# Patient Record
Sex: FEMALE | Race: WHITE | NOT HISPANIC OR LATINO
[De-identification: names, ages, dates, MRNs, and addresses within clinical notes are randomized per-mention and may not be internally consistent; named-entity substitution may affect disease eponyms.]

---

## 2017-02-23 ENCOUNTER — APPOINTMENT (OUTPATIENT)
Dept: ENDOCRINOLOGY | Facility: CLINIC | Age: 82
End: 2017-02-23

## 2017-02-23 VITALS
SYSTOLIC BLOOD PRESSURE: 150 MMHG | WEIGHT: 150 LBS | OXYGEN SATURATION: 98 % | HEIGHT: 63 IN | BODY MASS INDEX: 26.58 KG/M2 | HEART RATE: 69 BPM | DIASTOLIC BLOOD PRESSURE: 62 MMHG

## 2017-02-23 RX ORDER — OLMESARTAN MEDOXOMIL 40 MG/1
40 TABLET, FILM COATED ORAL
Qty: 90 | Refills: 0 | Status: ACTIVE | COMMUNITY
Start: 2017-02-13

## 2017-02-23 RX ORDER — DENOSUMAB 60 MG/ML
60 INJECTION SUBCUTANEOUS
Qty: 1 | Refills: 0 | Status: COMPLETED | OUTPATIENT
Start: 2017-02-23

## 2017-02-23 RX ORDER — AMLODIPINE BESYLATE 5 MG/1
5 TABLET ORAL
Qty: 30 | Refills: 0 | Status: COMPLETED | COMMUNITY
Start: 2017-01-16

## 2017-02-23 RX ORDER — OLMESARTAN MEDOXOMIL AND HYDROCHLOROTHIAZIDE 20; 12.5 MG/1; MG/1
20-12.5 TABLET ORAL
Qty: 30 | Refills: 0 | Status: COMPLETED | COMMUNITY
Start: 2017-01-16

## 2017-02-23 RX ADMIN — DENOSUMAB 0 MG/ML: 60 INJECTION SUBCUTANEOUS at 00:00

## 2017-08-30 ENCOUNTER — APPOINTMENT (OUTPATIENT)
Dept: ENDOCRINOLOGY | Facility: CLINIC | Age: 82
End: 2017-08-30
Payer: MEDICARE

## 2017-08-30 VITALS
OXYGEN SATURATION: 98 % | SYSTOLIC BLOOD PRESSURE: 110 MMHG | DIASTOLIC BLOOD PRESSURE: 50 MMHG | BODY MASS INDEX: 26.22 KG/M2 | HEIGHT: 63 IN | HEART RATE: 75 BPM | WEIGHT: 148 LBS

## 2017-08-30 PROCEDURE — 99214 OFFICE O/P EST MOD 30 MIN: CPT | Mod: 25

## 2017-08-30 PROCEDURE — 96372 THER/PROPH/DIAG INJ SC/IM: CPT

## 2017-08-30 RX ORDER — DENOSUMAB 60 MG/ML
60 INJECTION SUBCUTANEOUS
Qty: 1 | Refills: 0 | Status: COMPLETED | OUTPATIENT
Start: 2017-08-30

## 2017-08-30 RX ADMIN — DENOSUMAB 0 MG/ML: 60 INJECTION SUBCUTANEOUS at 00:00

## 2017-09-07 ENCOUNTER — APPOINTMENT (OUTPATIENT)
Dept: ENDOCRINOLOGY | Facility: CLINIC | Age: 82
End: 2017-09-07
Payer: MEDICARE

## 2017-09-07 VITALS — WEIGHT: 144 LBS | BODY MASS INDEX: 25.2 KG/M2 | HEIGHT: 63.5 IN

## 2017-09-07 PROCEDURE — 77080 DXA BONE DENSITY AXIAL: CPT

## 2017-11-10 ENCOUNTER — APPOINTMENT (OUTPATIENT)
Dept: MAMMOGRAPHY | Facility: IMAGING CENTER | Age: 82
End: 2017-11-10

## 2017-12-05 ENCOUNTER — APPOINTMENT (OUTPATIENT)
Dept: MAMMOGRAPHY | Facility: IMAGING CENTER | Age: 82
End: 2017-12-05
Payer: MEDICARE

## 2017-12-05 ENCOUNTER — OUTPATIENT (OUTPATIENT)
Dept: OUTPATIENT SERVICES | Facility: HOSPITAL | Age: 82
LOS: 1 days | End: 2017-12-05
Payer: MEDICARE

## 2017-12-05 DIAGNOSIS — Z12.31 ENCOUNTER FOR SCREENING MAMMOGRAM FOR MALIGNANT NEOPLASM OF BREAST: ICD-10-CM

## 2017-12-05 PROCEDURE — 77067 SCR MAMMO BI INCL CAD: CPT

## 2017-12-05 PROCEDURE — 77063 BREAST TOMOSYNTHESIS BI: CPT | Mod: 26

## 2017-12-05 PROCEDURE — 77063 BREAST TOMOSYNTHESIS BI: CPT

## 2017-12-05 PROCEDURE — G0202: CPT | Mod: 26

## 2018-03-08 ENCOUNTER — APPOINTMENT (OUTPATIENT)
Dept: ENDOCRINOLOGY | Facility: CLINIC | Age: 83
End: 2018-03-08
Payer: MEDICARE

## 2018-03-08 VITALS
RESPIRATION RATE: 16 BRPM | HEART RATE: 68 BPM | OXYGEN SATURATION: 98 % | HEIGHT: 63.5 IN | BODY MASS INDEX: 25.72 KG/M2 | WEIGHT: 147 LBS | DIASTOLIC BLOOD PRESSURE: 58 MMHG | SYSTOLIC BLOOD PRESSURE: 130 MMHG

## 2018-03-08 PROCEDURE — 99214 OFFICE O/P EST MOD 30 MIN: CPT | Mod: 25

## 2018-03-08 PROCEDURE — 96372 THER/PROPH/DIAG INJ SC/IM: CPT

## 2018-03-08 RX ORDER — DENOSUMAB 60 MG/ML
60 INJECTION SUBCUTANEOUS
Qty: 0 | Refills: 0 | Status: COMPLETED | OUTPATIENT
Start: 2018-03-08

## 2018-03-08 RX ORDER — ROSUVASTATIN CALCIUM 5 MG/1
5 TABLET, FILM COATED ORAL
Refills: 0 | Status: DISCONTINUED | COMMUNITY
End: 2018-03-08

## 2018-03-08 RX ADMIN — DENOSUMAB 0 MG/ML: 60 INJECTION SUBCUTANEOUS at 00:00

## 2018-09-12 ENCOUNTER — APPOINTMENT (OUTPATIENT)
Dept: ENDOCRINOLOGY | Facility: CLINIC | Age: 83
End: 2018-09-12
Payer: MEDICARE

## 2018-09-12 VITALS
OXYGEN SATURATION: 98 % | HEIGHT: 63.5 IN | WEIGHT: 149 LBS | HEART RATE: 70 BPM | SYSTOLIC BLOOD PRESSURE: 140 MMHG | DIASTOLIC BLOOD PRESSURE: 52 MMHG | BODY MASS INDEX: 26.08 KG/M2

## 2018-09-12 PROCEDURE — 99214 OFFICE O/P EST MOD 30 MIN: CPT | Mod: 25

## 2018-09-12 PROCEDURE — 77080 DXA BONE DENSITY AXIAL: CPT | Mod: GA

## 2018-09-12 PROCEDURE — 96372 THER/PROPH/DIAG INJ SC/IM: CPT

## 2018-09-12 RX ORDER — DENOSUMAB 60 MG/ML
60 INJECTION SUBCUTANEOUS
Qty: 0 | Refills: 0 | Status: COMPLETED | OUTPATIENT
Start: 2018-09-12

## 2018-09-12 RX ADMIN — DENOSUMAB 0 MG/ML: 60 INJECTION SUBCUTANEOUS at 00:00

## 2019-04-10 ENCOUNTER — APPOINTMENT (OUTPATIENT)
Dept: ENDOCRINOLOGY | Facility: CLINIC | Age: 84
End: 2019-04-10
Payer: MEDICARE

## 2019-04-10 VITALS
HEART RATE: 64 BPM | WEIGHT: 150 LBS | HEIGHT: 63.5 IN | DIASTOLIC BLOOD PRESSURE: 58 MMHG | BODY MASS INDEX: 26.25 KG/M2 | RESPIRATION RATE: 98 BRPM | SYSTOLIC BLOOD PRESSURE: 130 MMHG

## 2019-04-10 PROCEDURE — 99214 OFFICE O/P EST MOD 30 MIN: CPT | Mod: 25

## 2019-04-10 PROCEDURE — 96372 THER/PROPH/DIAG INJ SC/IM: CPT

## 2019-04-10 RX ORDER — DENOSUMAB 60 MG/ML
60 INJECTION SUBCUTANEOUS
Refills: 0 | Status: COMPLETED | OUTPATIENT
Start: 2019-04-10

## 2019-04-10 RX ORDER — EVOLOCUMAB 140 MG/ML
INJECTION, SOLUTION SUBCUTANEOUS
Refills: 0 | Status: ACTIVE | COMMUNITY

## 2019-04-10 RX ORDER — OMEGA-3S/DHA/EPA/FISH OIL 300-1000MG
CAPSULE ORAL
Refills: 0 | Status: DISCONTINUED | COMMUNITY
End: 2019-04-10

## 2019-04-10 RX ORDER — AMLODIPINE BESYLATE 10 MG/1
10 TABLET ORAL
Qty: 90 | Refills: 0 | Status: DISCONTINUED | COMMUNITY
Start: 2017-02-13 | End: 2019-04-10

## 2019-04-10 RX ADMIN — DENOSUMAB 60 MG/ML: 60 INJECTION SUBCUTANEOUS at 00:00

## 2019-04-11 NOTE — END OF VISIT
[FreeTextEntry3] : I, Alina Ledesma, authored this note working as a medical scribe for Dr. Foster.  04/10/2019.  2:15PM. \par This note was authored by Alina Ledesma working as medical scribe for me. I have reviewed, edited, and revised the note as needed. I am in agreement with the exam findings, imaging findings, and treatment plan.  Nilson Foster MD

## 2019-04-11 NOTE — PROCEDURE
[FreeTextEntry1] : Bone mineral density: 09/12/2018 \par Indication: vs.  2017 prior test showed bone loss\par Spine: not done\par Total hip: -2.3 osteopenia, no significant change \par Femoral neck: -2.5 osteoporosis, no significant change \par Proximal radius: -2.7 osteoporosis (+4.6%)\par \par bone mineral density September 2017\par Lumbar spine 1-4 Not performed\par Total hip: -2.3 osteopenia, no significant change \par Femoral neck: -2.7  osteoporosis, -4.1% not significant   \par Proximal wrist: -3.1 osteoporosis, no significant change  \par \par bone mineral density 7/1/15\par spine not performed to scoliosis\par Total hip -2.2, osteopenia, no significant change versus 2013\par Femoral neck -2.5, osteoporosis, no significant change versus 2013\par Proximal radius -3.1, osteoporosis, no significant change

## 2019-04-11 NOTE — ASSESSMENT
[FreeTextEntry1] : 84 y/o female with osteoporosis and hypothyroidism. \par \par 1. Osteoporosis: Previously treated with Fosamax, HRT, and Forteo (2 years). On Prolia since 2011 tolerating well. No thigh pain. No ONJ. No interval fx. BMD 2013 stable. BMD 2017 stable although low with sl decrease in fem neck. BMD 9/2018 indicates stable hip density and improvement in the proximal radius. c/o back pain. Continue Prolia buy and bill. \par \par 2. Hypothyroidism: clinically and chemically euthyroid on LT4 100 mcg daily, Dr. Hernández inc dose to levothyroxine to 100. \par \par Labs from Dr. Dionicio Hernández. \par \par f/u in 6 mons.  [Denosumab Therapy] : Risks  and benefits of denosumab therapy were discussed with the patient including eczema, cellulitis, osteonecrosis of the jaw and atypical femur fractures

## 2019-04-11 NOTE — PHYSICAL EXAM
[Alert] : alert [No Acute Distress] : no acute distress [Well Nourished] : well nourished [Well Developed] : well developed [Normal Sclera/Conjunctiva] : normal sclera/conjunctiva [Normal Oropharynx] : the oropharynx was normal [No Proptosis] : no proptosis [Thyroid Not Enlarged] : the thyroid was not enlarged [No Thyroid Nodules] : there were no palpable thyroid nodules [No Respiratory Distress] : no respiratory distress [No Accessory Muscle Use] : no accessory muscle use [Clear to Auscultation] : lungs were clear to auscultation bilaterally [Normal Rate] : heart rate was normal  [Normal S1, S2] : normal S1 and S2 [Regular Rhythm] : with a regular rhythm [Normal Bowel Sounds] : normal bowel sounds [Not Tender] : non-tender [Not Distended] : not distended [Soft] : abdomen soft [No Spinal Tenderness] : no spinal tenderness [Kyphosis] : kyphosis present [No Stigmata of Cushings Syndrome] : no stigmata of cushings syndrome [Normal Strength/Tone] : muscle strength and tone were normal [Normal Gait] : normal gait [Normal Reflexes] : deep tendon reflexes were 2+ and symmetric [Oriented x3] : oriented to person, place, and time [No Tremors] : no tremors [de-identified] : erythematous macular rash mostly back

## 2019-04-11 NOTE — REVIEW OF SYSTEMS
[Negative] : Heme/Lymph [All other systems negative] : All other systems negative [Back Pain] : back pain [Muscle Cramps] : muscle cramps [Fatigue] : no fatigue [Chest Pain] : no chest pain [Shortness Of Breath] : no shortness of breath [Nausea] : no nausea [Vomiting] : no vomiting was observed [Constipation] : no constipation [Diarrhea] : no diarrhea [Cold Intolerance] : cold tolerant [Heat Intolerance] : heat tolerant [FreeTextEntry5] : PETR

## 2019-04-11 NOTE — HISTORY OF PRESENT ILLNESS
[Alendronate (Fosomax)] : Alendronate [Estrogens (HRT)] : HRT [Patient taking Meds Correctly] : Patient is taking meds correctly [Teriparatide (Forteo)] : Teriparatide [Prolia (Denosumab)] : Prolia (Denosumab) [Regular Dental Follow-Up] : regular dental follow-up [Previous Fragility Fracture] : previous fragility fracture(s) [FreeTextEntry1] : f/u 84 y/o female with osteoporosis and hypothyroidism. \par \par Prev on HRT then Fosamax then Forteo for 2 years.On Prolia since Nov 2011. BMD 2015 stable. BMD 9/2017 stable, sl decrease fem neck. BMD 2018 improved in proximal radius. Last DDS within 3 mons. No ONJ. No thigh pain. No interval fx. \par \par Previously taking Levothyroxine 100 mcg daily. No fatigue, palpitations, tremors, heat or cold intolerance, constipation or diarrhea.\par \par Pt had an episode of Vertigo for 3 mons thought to be from Cipro (for back pain). \par  [Disordered Eating] : no past or present history of disordered eating [Taking Steroids] : no past or present history of taking steroids [Kidney Stones] : no history of kidney stones [Hyperparathyroidism] : no hyperparathyroidism [de-identified] : prior comp fx

## 2019-10-16 ENCOUNTER — APPOINTMENT (OUTPATIENT)
Dept: ENDOCRINOLOGY | Facility: CLINIC | Age: 84
End: 2019-10-16
Payer: MEDICARE

## 2019-10-16 VITALS
HEART RATE: 68 BPM | DIASTOLIC BLOOD PRESSURE: 62 MMHG | SYSTOLIC BLOOD PRESSURE: 124 MMHG | OXYGEN SATURATION: 98 % | RESPIRATION RATE: 18 BRPM

## 2019-10-16 PROCEDURE — 99212 OFFICE O/P EST SF 10 MIN: CPT | Mod: 25

## 2019-10-16 PROCEDURE — 96372 THER/PROPH/DIAG INJ SC/IM: CPT

## 2019-10-16 RX ORDER — DENOSUMAB 60 MG/ML
60 INJECTION SUBCUTANEOUS
Qty: 1 | Refills: 0 | Status: COMPLETED | OUTPATIENT
Start: 2019-10-16

## 2019-10-16 RX ADMIN — DENOSUMAB 0 MG/ML: 60 INJECTION SUBCUTANEOUS at 00:00

## 2020-05-15 ENCOUNTER — APPOINTMENT (OUTPATIENT)
Dept: ENDOCRINOLOGY | Facility: CLINIC | Age: 85
End: 2020-05-15
Payer: MEDICARE

## 2020-05-15 VITALS — DIASTOLIC BLOOD PRESSURE: 60 MMHG | SYSTOLIC BLOOD PRESSURE: 132 MMHG

## 2020-05-15 VITALS
WEIGHT: 134 LBS | DIASTOLIC BLOOD PRESSURE: 62 MMHG | SYSTOLIC BLOOD PRESSURE: 144 MMHG | TEMPERATURE: 98 F | HEIGHT: 63.5 IN | BODY MASS INDEX: 23.45 KG/M2 | HEART RATE: 77 BPM | OXYGEN SATURATION: 99 %

## 2020-05-15 DIAGNOSIS — I10 ESSENTIAL (PRIMARY) HYPERTENSION: ICD-10-CM

## 2020-05-15 PROCEDURE — 99214 OFFICE O/P EST MOD 30 MIN: CPT | Mod: 25

## 2020-05-15 PROCEDURE — 96372 THER/PROPH/DIAG INJ SC/IM: CPT

## 2020-05-15 RX ORDER — DENOSUMAB 60 MG/ML
60 INJECTION SUBCUTANEOUS
Qty: 1 | Refills: 0 | Status: COMPLETED | OUTPATIENT
Start: 2020-05-15

## 2020-05-15 RX ORDER — AMLODIPINE BESYLATE 10 MG/1
10 TABLET ORAL
Refills: 0 | Status: ACTIVE | COMMUNITY

## 2020-05-15 RX ADMIN — DENOSUMAB 60 MG/ML: 60 INJECTION SUBCUTANEOUS at 00:00

## 2020-05-15 NOTE — REASON FOR VISIT
[Follow - Up] : a follow-up visit [Osteoporosis] : osteoporosis [Follow-Up: _____] : a [unfilled] follow-up visit [FreeTextEntry1] : osteoporosis

## 2020-06-29 ENCOUNTER — APPOINTMENT (OUTPATIENT)
Dept: RADIOLOGY | Facility: IMAGING CENTER | Age: 85
End: 2020-06-29
Payer: MEDICARE

## 2020-06-29 ENCOUNTER — OUTPATIENT (OUTPATIENT)
Dept: OUTPATIENT SERVICES | Facility: HOSPITAL | Age: 85
LOS: 1 days | End: 2020-06-29
Payer: MEDICARE

## 2020-06-29 ENCOUNTER — APPOINTMENT (OUTPATIENT)
Dept: ULTRASOUND IMAGING | Facility: IMAGING CENTER | Age: 85
End: 2020-06-29
Payer: MEDICARE

## 2020-06-29 ENCOUNTER — APPOINTMENT (OUTPATIENT)
Dept: MAMMOGRAPHY | Facility: IMAGING CENTER | Age: 85
End: 2020-06-29
Payer: MEDICARE

## 2020-06-29 DIAGNOSIS — N63.10 UNSPECIFIED LUMP IN THE RIGHT BREAST, UNSPECIFIED QUADRANT: ICD-10-CM

## 2020-06-29 PROCEDURE — 76642 ULTRASOUND BREAST LIMITED: CPT | Mod: 26,RT

## 2020-06-29 PROCEDURE — 77066 DX MAMMO INCL CAD BI: CPT | Mod: 26

## 2020-06-29 PROCEDURE — G0279: CPT

## 2020-06-29 PROCEDURE — 77066 DX MAMMO INCL CAD BI: CPT

## 2020-06-29 PROCEDURE — G0279: CPT | Mod: 26

## 2020-06-29 PROCEDURE — 76642 ULTRASOUND BREAST LIMITED: CPT

## 2020-06-29 PROCEDURE — 71046 X-RAY EXAM CHEST 2 VIEWS: CPT | Mod: 26

## 2020-06-29 PROCEDURE — 71046 X-RAY EXAM CHEST 2 VIEWS: CPT

## 2020-07-06 NOTE — ADDENDUM
[FreeTextEntry1] : July 2, 2020 telephone\par Patient requests letter of "clearance" prior to dental extraction.  Patient previously advised of the small risk of osteonecrosis of the jaw.  Patient has no specific contraindication to tooth extraction even with small risk of ONJ. Nilson Foster MD

## 2020-07-06 NOTE — HISTORY OF PRESENT ILLNESS
[Alendronate (Fosomax)] : Alendronate [Estrogens (HRT)] : HRT [Teriparatide (Forteo)] : Teriparatide [Patient taking Meds Correctly] : Patient is taking meds correctly [Prolia (Denosumab)] : Prolia (Denosumab) [Regular Dental Follow-Up] : regular dental follow-up [Previous Fragility Fracture] : previous fragility fracture(s) [FreeTextEntry1] :  osteoporosis \par Prev on HRT then Fosamax then Forteo for 2 years.On Prolia since Nov 2011. BMD 2015 stable. BMD 9/2017 stable, sl decrease fem neck. BMD 2018 improved in proximal radius. Last DDS within 6 mons. No ONJ. No thigh pain. No interval fx. \par Thyroid: on Levothyroxine 100 mcg daily. No fatigue, palpitations, tremors, heat or cold intolerance, constipation or diarrhea.\par New HTN added amlodipine\par Sees urology for bladder dysfunction\par  [Disordered Eating] : no past or present history of disordered eating [Kidney Stones] : no history of kidney stones [Taking Steroids] : no past or present history of taking steroids [Hyperparathyroidism] : no hyperparathyroidism [de-identified] : prior comp fx

## 2020-07-06 NOTE — ASSESSMENT
[Denosumab Therapy] : Risks  and benefits of denosumab therapy were discussed with the patient including eczema, cellulitis, osteonecrosis of the jaw and atypical femur fractures [FreeTextEntry1] : 87 y/o female with osteoporosis and hypothyroidism. \par \par 1. Osteoporosis: Previously treated with Fosamax, HRT, and Forteo (2 years). On Prolia since 2011 tolerating well. No thigh pain. No ONJ. No interval fx. BMD 2013 stable. BMD 2017 stable although low with sl decrease in fem neck. BMD 9/2018 indicates stable hip density and improvement in the proximal radius. c/o back pain. Continue Prolia buy and bill. \par \par 2. Hypothyroidism: clinically and chemically euthyroid on LT4 100 mcg daily, Dr. Hernández inc dose to levothyroxine to 100. \par 3.  Hypertension: Blood pressure well controlled on current medication. \par 4. Kyphosis: stable on exam\par Labs from Dr. Dionicio Hernández. \par \par f/u in 6 mons.

## 2020-07-06 NOTE — PHYSICAL EXAM
[Alert] : alert [Well Nourished] : well nourished [No Acute Distress] : no acute distress [Well Developed] : well developed [Normal Sclera/Conjunctiva] : normal sclera/conjunctiva [EOMI] : extra ocular movement intact [No Proptosis] : no proptosis [Normal Oropharynx] : the oropharynx was normal [Thyroid Not Enlarged] : the thyroid was not enlarged [No Thyroid Nodules] : no palpable thyroid nodules [No Respiratory Distress] : no respiratory distress [No Accessory Muscle Use] : no accessory muscle use [Clear to Auscultation] : lungs were clear to auscultation bilaterally [Normal S1, S2] : normal S1 and S2 [Normal Rate] : heart rate was normal [Regular Rhythm] : with a regular rhythm [No Edema] : no peripheral edema [Normal Bowel Sounds] : normal bowel sounds [Not Tender] : non-tender [Not Distended] : not distended [Soft] : abdomen soft [Normal Anterior Cervical Nodes] : no anterior cervical lymphadenopathy [Normal Posterior Cervical Nodes] : no posterior cervical lymphadenopathy [No Spinal Tenderness] : no spinal tenderness [Kyphosis] : kyphosis present [Normal Gait] : normal gait [Normal Strength/Tone] : muscle strength and tone were normal [No Stigmata of Cushings Syndrome] : no stigmata of Cushings Syndrome [No Rash] : no rash [Normal Reflexes] : deep tendon reflexes were 2+ and symmetric [No Tremors] : no tremors [Oriented x3] : oriented to person, place, and time [Acanthosis Nigricans] : no acanthosis nigricans

## 2020-11-20 ENCOUNTER — APPOINTMENT (OUTPATIENT)
Dept: ENDOCRINOLOGY | Facility: CLINIC | Age: 85
End: 2020-11-20

## 2020-11-23 ENCOUNTER — APPOINTMENT (OUTPATIENT)
Dept: ENDOCRINOLOGY | Facility: CLINIC | Age: 85
End: 2020-11-23

## 2020-12-02 ENCOUNTER — APPOINTMENT (OUTPATIENT)
Dept: ENDOCRINOLOGY | Facility: CLINIC | Age: 85
End: 2020-12-02
Payer: MEDICARE

## 2020-12-02 VITALS — WEIGHT: 131 LBS | BODY MASS INDEX: 23.21 KG/M2 | TEMPERATURE: 97.7 F | HEIGHT: 62.9 IN

## 2020-12-02 PROCEDURE — 77080 DXA BONE DENSITY AXIAL: CPT | Mod: GA

## 2020-12-04 ENCOUNTER — APPOINTMENT (OUTPATIENT)
Dept: ENDOCRINOLOGY | Facility: CLINIC | Age: 85
End: 2020-12-04
Payer: MEDICARE

## 2020-12-04 PROCEDURE — 96372 THER/PROPH/DIAG INJ SC/IM: CPT

## 2020-12-04 RX ORDER — DENOSUMAB 60 MG/ML
60 INJECTION SUBCUTANEOUS
Qty: 1 | Refills: 0 | Status: COMPLETED | OUTPATIENT
Start: 2020-12-04

## 2020-12-04 RX ADMIN — DENOSUMAB 0 MG/ML: 60 INJECTION SUBCUTANEOUS at 00:00

## 2021-05-19 ENCOUNTER — APPOINTMENT (OUTPATIENT)
Dept: UROGYNECOLOGY | Facility: CLINIC | Age: 86
End: 2021-05-19
Payer: MEDICARE

## 2021-05-19 VITALS
SYSTOLIC BLOOD PRESSURE: 148 MMHG | TEMPERATURE: 96.9 F | WEIGHT: 130 LBS | BODY MASS INDEX: 22.2 KG/M2 | DIASTOLIC BLOOD PRESSURE: 68 MMHG | HEIGHT: 64 IN

## 2021-05-19 DIAGNOSIS — Z86.79 PERSONAL HISTORY OF OTHER DISEASES OF THE CIRCULATORY SYSTEM: ICD-10-CM

## 2021-05-19 DIAGNOSIS — Z82.3 FAMILY HISTORY OF STROKE: ICD-10-CM

## 2021-05-19 DIAGNOSIS — Z78.9 OTHER SPECIFIED HEALTH STATUS: ICD-10-CM

## 2021-05-19 DIAGNOSIS — Z60.2 PROBLEMS RELATED TO LIVING ALONE: ICD-10-CM

## 2021-05-19 DIAGNOSIS — Z87.39 PERSONAL HISTORY OF OTHER DISEASES OF THE MUSCULOSKELETAL SYSTEM AND CONNECTIVE TISSUE: ICD-10-CM

## 2021-05-19 DIAGNOSIS — Z86.39 PERSONAL HISTORY OF OTHER ENDOCRINE, NUTRITIONAL AND METABOLIC DISEASE: ICD-10-CM

## 2021-05-19 DIAGNOSIS — Z63.4 DISAPPEARANCE AND DEATH OF FAMILY MEMBER: ICD-10-CM

## 2021-05-19 LAB
BILIRUB UR QL STRIP: NORMAL
CLARITY UR: CLEAR
COLLECTION METHOD: NORMAL
GLUCOSE UR-MCNC: NORMAL
HCG UR QL: 0.2 EU/DL
HGB UR QL STRIP.AUTO: NORMAL
KETONES UR-MCNC: NORMAL
LEUKOCYTE ESTERASE UR QL STRIP: NORMAL
NITRITE UR QL STRIP: NORMAL
PH UR STRIP: 5.5
PROT UR STRIP-MCNC: NORMAL
SP GR UR STRIP: 1.01

## 2021-05-19 PROCEDURE — 99204 OFFICE O/P NEW MOD 45 MIN: CPT

## 2021-05-19 PROCEDURE — 81003 URINALYSIS AUTO W/O SCOPE: CPT | Mod: QW

## 2021-05-19 SDOH — SOCIAL STABILITY - SOCIAL INSECURITY: DISSAPEARANCE AND DEATH OF FAMILY MEMBER: Z63.4

## 2021-05-19 SDOH — SOCIAL STABILITY - SOCIAL INSECURITY: PROBLEMS RELATED TO LIVING ALONE: Z60.2

## 2021-05-19 NOTE — DISCUSSION/SUMMARY
[FreeTextEntry1] : We discussed the above noted findings with the patient.  She is going to have a renal ultrasound due to the retention to rule out hydronephrosis.  She will return for urodynamics to better assess cause of incontinence.  We discussed the option of sacral nerve modulation to potentially assist with improved bladder emptying.  The option of intermittent self-catheterization was offered, patient is not interested.  Patient has previously tried BTL emsella and PTNS without improvement. We discussed behavioral modification techniques to help empty the bladder. We will further discuss treatment options after her evaluation is completed. All questions were answered.

## 2021-05-19 NOTE — HISTORY OF PRESENT ILLNESS
[Vaginal Wall Prolapse] : no [Rectal Prolapse] : no [Unable To Restrain Bowel Movement] : no [x2] : nocturia two times a night [Uses ___ pads per day] : uses [unfilled] pad(s) per day [Constipation Obstructed Defecation] : no [Vaginal Pain] : no [] : years ago [FreeTextEntry5] : informed of prolapse many years ago, asymptomatic [de-identified] : constantly [de-identified] : 1 [de-identified] : approx. 50% does not realize leaking [de-identified] : every night [de-identified] : 1+ [de-identified] : 3-4x/day [de-identified] : every night [de-identified] : occasionally [de-identified] : 1 [FreeTextEntry1] : PMH: HLD, arthritis, HTN\par PSH: back surgery (rods)\par \par seen by Dr. Conklin 10+ years ago, took vesicare 10 for many years stopped 1+ year ago\par \par tried BTL emsella, PTNS- no improvement\par pt has seen Dr Olguin in the past.

## 2021-05-19 NOTE — PHYSICAL EXAM
[No Acute Distress] : in no acute distress [Well developed] : well developed [Well Nourished] : ~L well nourished [Oriented x3] : oriented to person, place, and time [Respirations regular] : ~T respiratory rate was regular [No Edema] : ~T edema was not present [Warm and Dry] : was warm and dry to touch [Normal Gait] : gait was normal [Vulvar Atrophy] : vulvar atrophy [Post Void Residual ____ml] : post void residual was [unfilled] ml [Chaperone Present] : A chaperone was present in the examining room during all aspects of the physical examination [Mass (___ Cm)] : no ~M [unfilled] abdominal mass was palpated [Tenderness] : ~T no ~M abdominal tenderness observed [Distended] : not distended [Inguinal LAD] : no adenopathy was noted in the inguinal lymph nodes [Normal Appearance] : general appearance was normal [Atrophy] : atrophy [Normal] : normal

## 2021-05-19 NOTE — REASON FOR VISIT
[Initial Visit ___] : an initial visit for [unfilled] [Urinary Incontinence] : urinary incontinence [Questionnaire Received] : Patient questionnaire received [Intake Form Reviewed] : Patient intake form with past medical history, surgical history, family history and social history reviewed today

## 2021-05-19 NOTE — OB HISTORY
[Total Preg ___] : : [unfilled] [Full Term ___] : [unfilled] (full-term) [Vaginal ___] : [unfilled] vaginal delivery(s) [AB Spont ___] : [unfilled] miscarriage(s) [Definite ___ (Date)] : the last menstrual period was [unfilled]

## 2021-06-01 ENCOUNTER — APPOINTMENT (OUTPATIENT)
Dept: ULTRASOUND IMAGING | Facility: CLINIC | Age: 86
End: 2021-06-01
Payer: MEDICARE

## 2021-06-01 PROCEDURE — 76770 US EXAM ABDO BACK WALL COMP: CPT

## 2021-06-03 ENCOUNTER — TRANSCRIPTION ENCOUNTER (OUTPATIENT)
Age: 86
End: 2021-06-03

## 2021-06-07 ENCOUNTER — RESULT CHARGE (OUTPATIENT)
Age: 86
End: 2021-06-07

## 2021-06-07 ENCOUNTER — APPOINTMENT (OUTPATIENT)
Dept: UROGYNECOLOGY | Facility: CLINIC | Age: 86
End: 2021-06-07
Payer: MEDICARE

## 2021-06-07 ENCOUNTER — APPOINTMENT (OUTPATIENT)
Dept: ENDOCRINOLOGY | Facility: CLINIC | Age: 86
End: 2021-06-07
Payer: MEDICARE

## 2021-06-07 VITALS — HEART RATE: 66 BPM | SYSTOLIC BLOOD PRESSURE: 157 MMHG | TEMPERATURE: 96.8 F | DIASTOLIC BLOOD PRESSURE: 97 MMHG

## 2021-06-07 VITALS
HEIGHT: 64 IN | WEIGHT: 130 LBS | TEMPERATURE: 97.7 F | DIASTOLIC BLOOD PRESSURE: 60 MMHG | BODY MASS INDEX: 22.2 KG/M2 | OXYGEN SATURATION: 98 % | HEART RATE: 63 BPM | SYSTOLIC BLOOD PRESSURE: 130 MMHG

## 2021-06-07 LAB
BILIRUB UR QL STRIP: NEGATIVE
CLARITY UR: NORMAL
COLLECTION METHOD: NORMAL
GLUCOSE UR-MCNC: NEGATIVE
HCG UR QL: 0.2 EU/DL
HGB UR QL STRIP.AUTO: NORMAL
KETONES UR-MCNC: NEGATIVE
LEUKOCYTE ESTERASE UR QL STRIP: NORMAL
NITRITE UR QL STRIP: NEGATIVE
PH UR STRIP: 5.5
PROT UR STRIP-MCNC: NEGATIVE
SP GR UR STRIP: 1.02

## 2021-06-07 PROCEDURE — 96372 THER/PROPH/DIAG INJ SC/IM: CPT

## 2021-06-07 PROCEDURE — 99214 OFFICE O/P EST MOD 30 MIN: CPT

## 2021-06-07 PROCEDURE — 99214 OFFICE O/P EST MOD 30 MIN: CPT | Mod: 25

## 2021-06-07 PROCEDURE — 81003 URINALYSIS AUTO W/O SCOPE: CPT | Mod: QW

## 2021-06-07 RX ORDER — DENOSUMAB 60 MG/ML
60 INJECTION SUBCUTANEOUS
Qty: 1 | Refills: 0 | Status: COMPLETED | OUTPATIENT
Start: 2021-06-07

## 2021-06-07 RX ADMIN — DENOSUMAB 60 MG/ML: 60 INJECTION SUBCUTANEOUS at 00:00

## 2021-06-07 NOTE — PROCEDURE
[Straight Catheterization] : insertion of a straight catheter [Urinary Retention] : urinary retention [Patient] : the patient [Allergies Reviewed] : Allergies reviewed [None] : none [___ Fr Straight Tip] : a [unfilled] in [place] straight tip catheter [Cloudy] : cloudy [Culture] : culture [Urinalysis] : urinalysis [Residual Volume ___ cc] : residual volume [unfilled] cc [No Complications] : no complications [Tolerated Well] : the patient tolerated the procedure well [Post procedure instructions and information given] : Post procedure instructions and information were given and reviewed with patient. [0] : 0

## 2021-06-08 NOTE — DISCUSSION/SUMMARY
[FreeTextEntry1] : Ms. Matson presents today with suspected UTI.  She has incomplete bladder emptying and a new diagnosis of left hydronephrosis.  \par \par #Udip with small leukocytes and trace blood:\par -Rx for Macrobid 100mg PO q 12 hours x 5 days prescribed\par -F/U UA and urine culture\par -UDS rescheduled for 6/21\par \par #Moderate left-sided hydronephrosis\par -Discussed self catheterization versus Flomax. Risks and benefits of both options reviewed\par -The patient is unable to perform self catheterization secondary to previous history of back surgery\par -The patient has decided to try Flomax.  She knows the Flomax may increase urinary leakage and cause hypotension.  She has baseline hypertension and daily home SBP are in the 150s.  Her hypertensive medications are managed by her geriatrician, Dr. Hernández.  The patient agrees to discuss the addition of Flomax to her regimen with her doctor. \par -Rx for Flomax 0.4mg PO daily sent to the pharmacy.\par \par All questions answered to the patient's satisfaction.  She expresses understanding.  She knows to contact the office with any questions or concerns.

## 2021-06-08 NOTE — HISTORY OF PRESENT ILLNESS
[FreeTextEntry1] : This is a 87 year old F with incomplete bladder emptying and urinary incontinence without sensory awareness.  She presented today for UDS.  Urine obtained via sterile straight catheterization for PVR appeared cloudy.  PVR 200cc, Udip showed small leukocytes.  Ms. Matson has no urinary symptoms.  No burning with urination.  No urgency or frequency. \par \par Additionally, renal US on 6/3/2021 showed a large post void urinary bladder residual and moderate left-sided hydronephrosis.

## 2021-06-10 LAB
APPEARANCE: ABNORMAL
BACTERIA: ABNORMAL
BILIRUBIN URINE: NEGATIVE
BLOOD URINE: NEGATIVE
COLOR: YELLOW
GLUCOSE QUALITATIVE U: NEGATIVE
HYALINE CASTS: 0 /LPF
KETONES URINE: NEGATIVE
LEUKOCYTE ESTERASE URINE: ABNORMAL
MICROSCOPIC-UA: NORMAL
NITRITE URINE: NEGATIVE
PH URINE: 6
PROTEIN URINE: NORMAL
RED BLOOD CELLS URINE: 1 /HPF
SPECIFIC GRAVITY URINE: 1.02
SQUAMOUS EPITHELIAL CELLS: 0 /HPF
UROBILINOGEN URINE: NORMAL
WHITE BLOOD CELLS URINE: 183 /HPF

## 2021-06-21 ENCOUNTER — APPOINTMENT (OUTPATIENT)
Dept: UROGYNECOLOGY | Facility: CLINIC | Age: 86
End: 2021-06-21
Payer: MEDICARE

## 2021-06-21 DIAGNOSIS — R39.89 OTHER SYMPTOMS AND SIGNS INVOLVING THE GENITOURINARY SYSTEM: ICD-10-CM

## 2021-06-21 LAB
BILIRUB UR QL STRIP: NEGATIVE
CLARITY UR: NORMAL
COLLECTION METHOD: NORMAL
GLUCOSE UR-MCNC: NEGATIVE
HCG UR QL: 0.2 EU/DL
HGB UR QL STRIP.AUTO: NORMAL
KETONES UR-MCNC: NEGATIVE
LEUKOCYTE ESTERASE UR QL STRIP: NORMAL
NITRITE UR QL STRIP: POSITIVE
PH UR STRIP: 6
PROT UR STRIP-MCNC: NEGATIVE
SP GR UR STRIP: 1.02

## 2021-06-21 PROCEDURE — 99214 OFFICE O/P EST MOD 30 MIN: CPT

## 2021-06-21 PROCEDURE — 81003 URINALYSIS AUTO W/O SCOPE: CPT | Mod: QW

## 2021-06-21 RX ORDER — NITROFURANTOIN (MONOHYDRATE/MACROCRYSTALS) 25; 75 MG/1; MG/1
100 CAPSULE ORAL
Qty: 10 | Refills: 0 | Status: COMPLETED | COMMUNITY
Start: 2021-06-07 | End: 2021-06-21

## 2021-06-21 NOTE — PROCEDURE
[Straight Catheterization] : insertion of a straight catheter [Urinary Retention] : urinary retention [Patient] : the patient [Allergies Reviewed] : Allergies reviewed [None] : none [___ Fr Straight Tip] : a [unfilled] in [place] straight tip catheter [Cloudy] : cloudy [Culture] : culture [Urinalysis] : urinalysis [Residual Volume ___ cc] : residual volume [unfilled] cc [No Complications] : no complications [Tolerated Well] : the patient tolerated the procedure well [Post procedure instructions and information given] : Post procedure instructions and information were given and reviewed with patient. [0] : 0

## 2021-06-21 NOTE — DISCUSSION/SUMMARY
[FreeTextEntry1] : Ms. Matson presents today with suspected UTI.  She has incomplete bladder emptying and left hydronephrosis on Flomax.\par \par #Udip with positive nitrites, small leukocytes and trace blood:\par -F/U UA and urine culture\par -Will hold off prescribed antibiotics now as the patient remains asymptomatic.  Will treat 3 days leading up to her rescheduled UDS.  On day of UDS, no indication to repeat Udip, only send off repeat UCx. \par -Signs and symptoms of worsening infection reviewed. \par \par All questions answered to the patient's satisfaction.  She expresses understanding.  She knows to contact the office with any questions or concerns.

## 2021-06-21 NOTE — HISTORY OF PRESENT ILLNESS
[Cystocele (Obstetric)] : no [Vaginal Wall Prolapse] : no [Rectal Prolapse] : no [Urinary Frequency] : no [Feelings Of Urinary Urgency] : no [Pain During Urination (Dysuria)] : no [FreeTextEntry1] : This is a 87 year old F with incomplete bladder emptying and urinary incontinence without sensory awareness.  She presented today for UDS.  Urine obtained via sterile straight catheterization for PVR appeared cloudy.  PVR 270cc, Udip showed trace blood, positive nitrites, small leukocytes.  Ms. Matson has no urinary symptoms.  No burning with urination.  No urgency or frequency.  She is taking the prescribe Flomax and does feel she is urinating more. \par \par Urine culture from 6/7/2021 growing > 100K enterococcus avium.  She is s/p 5 day course of Macrobid. \par \par

## 2021-06-21 NOTE — PHYSICAL EXAM
[No Acute Distress] : in no acute distress [Well developed] : well developed [Well Nourished] : ~L well nourished [Good Hygeine] : demonstrates good hygeine [Oriented x3] : oriented to person, place, and time [Normal Mood/Affect] : mood and affect are normal [None] : no CVA tenderness [Warm and Dry] : was warm and dry to touch [Normal Gait] : gait was normal [Normal] : normal external genitalia [Labia Majora] : were normal [Labia Minora] : were normal [Normal Appearance] : general appearance was normal [Atrophy] : atrophy [Dry Mucosa] : dry mucosa [No Bleeding] : there was no active vaginal bleeding [Anxiety] : patient is not anxious

## 2021-06-24 LAB
APPEARANCE: ABNORMAL
BACTERIA: ABNORMAL
BILIRUBIN URINE: NEGATIVE
BLOOD URINE: NORMAL
COLOR: YELLOW
GLUCOSE QUALITATIVE U: NEGATIVE
HYALINE CASTS: 0 /LPF
KETONES URINE: NEGATIVE
LEUKOCYTE ESTERASE URINE: ABNORMAL
MICROSCOPIC-UA: NORMAL
NITRITE URINE: POSITIVE
PH URINE: 6
PROTEIN URINE: NORMAL
RED BLOOD CELLS URINE: 0 /HPF
SPECIFIC GRAVITY URINE: 1.01
SQUAMOUS EPITHELIAL CELLS: 1 /HPF
UROBILINOGEN URINE: NORMAL
WHITE BLOOD CELLS URINE: 269 /HPF

## 2021-06-28 ENCOUNTER — APPOINTMENT (OUTPATIENT)
Dept: UROGYNECOLOGY | Facility: CLINIC | Age: 86
End: 2021-06-28
Payer: MEDICARE

## 2021-06-28 ENCOUNTER — OUTPATIENT (OUTPATIENT)
Dept: OUTPATIENT SERVICES | Facility: HOSPITAL | Age: 86
LOS: 1 days | End: 2021-06-28
Payer: MEDICARE

## 2021-06-28 VITALS — HEART RATE: 80 BPM | DIASTOLIC BLOOD PRESSURE: 57 MMHG | SYSTOLIC BLOOD PRESSURE: 132 MMHG | TEMPERATURE: 97.7 F

## 2021-06-28 DIAGNOSIS — Z01.818 ENCOUNTER FOR OTHER PREPROCEDURAL EXAMINATION: ICD-10-CM

## 2021-06-28 PROCEDURE — 51784 ANAL/URINARY MUSCLE STUDY: CPT | Mod: 26

## 2021-06-28 PROCEDURE — 51797 INTRAABDOMINAL PRESSURE TEST: CPT

## 2021-06-28 PROCEDURE — 51729 CYSTOMETROGRAM W/VP&UP: CPT

## 2021-06-28 PROCEDURE — 51729 CYSTOMETROGRAM W/VP&UP: CPT | Mod: 26

## 2021-06-28 PROCEDURE — 51797 INTRAABDOMINAL PRESSURE TEST: CPT | Mod: 26

## 2021-06-28 PROCEDURE — 51784 ANAL/URINARY MUSCLE STUDY: CPT

## 2021-06-28 RX ORDER — TAMSULOSIN HYDROCHLORIDE 0.4 MG/1
0.4 CAPSULE ORAL
Qty: 90 | Refills: 0 | Status: DISCONTINUED | COMMUNITY
Start: 2021-06-07 | End: 2021-06-28

## 2021-06-30 ENCOUNTER — APPOINTMENT (OUTPATIENT)
Dept: UROGYNECOLOGY | Facility: CLINIC | Age: 86
End: 2021-06-30
Payer: MEDICARE

## 2021-06-30 VITALS
BODY MASS INDEX: 22.2 KG/M2 | WEIGHT: 130 LBS | SYSTOLIC BLOOD PRESSURE: 126 MMHG | HEIGHT: 64 IN | DIASTOLIC BLOOD PRESSURE: 62 MMHG | TEMPERATURE: 98.6 F

## 2021-06-30 LAB — BACTERIA UR CULT: NORMAL

## 2021-06-30 PROCEDURE — 99213 OFFICE O/P EST LOW 20 MIN: CPT

## 2021-07-20 ENCOUNTER — APPOINTMENT (OUTPATIENT)
Dept: UROGYNECOLOGY | Facility: CLINIC | Age: 86
End: 2021-07-20
Payer: MEDICARE

## 2021-07-20 VITALS — HEART RATE: 73 BPM | DIASTOLIC BLOOD PRESSURE: 62 MMHG | TEMPERATURE: 98.6 F | SYSTOLIC BLOOD PRESSURE: 130 MMHG

## 2021-07-20 PROCEDURE — 99213 OFFICE O/P EST LOW 20 MIN: CPT

## 2021-07-21 NOTE — HISTORY OF PRESENT ILLNESS
[FreeTextEntry1] : Patient with known hx of incomplete emptying of urine and OAB presents to office for follow up. Patient was seen by Dr. Rodriguez on 6/30/21 and advised to increase dose of Flomax to 8mg daily. Patient states she has been taking the increased dose. States she feels she is emptying more effectively. Denies any bothersome effects of medication. Denies pelvic pain, pressure or vaginal bleeding. She states for the OAB she was thinking about the SNS but decided she does not want to have that done at this time. States she is managing well with pads.

## 2021-07-21 NOTE — PHYSICAL EXAM
[No Acute Distress] : in no acute distress [Well developed] : well developed [Well Nourished] : ~L well nourished [Good Hygeine] : demonstrates good hygeine [Oriented x3] : oriented to person, place, and time [Normal Memory] : ~T memory was ~L unimpaired [Normal Mood/Affect] : mood and affect are normal [None] : no CVA tenderness [Warm and Dry] : was warm and dry to touch [Normal Gait] : gait was normal [Post Void Residual ____ml] : post void residual was [unfilled] ml [Anxiety] : patient is not anxious [Tenderness] : ~T no ~M abdominal tenderness observed [Distended] : not distended

## 2021-08-23 ENCOUNTER — RX RENEWAL (OUTPATIENT)
Age: 86
End: 2021-08-23

## 2021-10-18 ENCOUNTER — APPOINTMENT (OUTPATIENT)
Dept: UROGYNECOLOGY | Facility: CLINIC | Age: 86
End: 2021-10-18
Payer: MEDICARE

## 2021-10-18 VITALS — HEART RATE: 62 BPM | SYSTOLIC BLOOD PRESSURE: 145 MMHG | DIASTOLIC BLOOD PRESSURE: 60 MMHG | TEMPERATURE: 97.3 F

## 2021-10-18 PROCEDURE — 99213 OFFICE O/P EST LOW 20 MIN: CPT

## 2021-10-18 NOTE — DISCUSSION/SUMMARY
[FreeTextEntry1] : 1. Incomplete emptying\par - Discussed elevated PVR with patient, she continues to not want to have a catheter placed or learn CIC. Educated patient importance of preventing urinary retention and urinary retention can cause kidney damage, patient agrees and verbalized understanding. She is opting to continue with Flomax 8 mg daily\par - Advised to continue with void techniques to promote complete emptying of bladder\par \par 2. OAB\par - Discussed SNS with patient, educated about how it works and benefits, patient wants to think about it. Education materials provided about [employer]. Discussed if she wants to have SNS done she will schedule the PNE trial with Dr. Rodriguez. \par - Will Continue with diet/behavioral modifications\par \par Will RTO for follow up and PVR check in 6 months or sooner if needed. Patient agrees to call office with any questions or concerns, verbalized understanding.

## 2021-10-18 NOTE — HISTORY OF PRESENT ILLNESS
[FreeTextEntry1] : Patient with known hx of incomplete emptying of urine and OAB presents to office for follow up. Patient states she has been taking the Flomax 8mg daily with no complaints. States she feels she is emptying more effectively. Denies any bothersome effects of medication. Denies pelvic pain, pressure or vaginal bleeding. She states for the OAB she was thinking about the SNS but is still unsure if she wants to go through with the PNE. States she is managing well with pads.

## 2021-12-13 ENCOUNTER — APPOINTMENT (OUTPATIENT)
Dept: ENDOCRINOLOGY | Facility: CLINIC | Age: 86
End: 2021-12-13
Payer: MEDICARE

## 2021-12-13 PROCEDURE — 96372 THER/PROPH/DIAG INJ SC/IM: CPT

## 2021-12-13 RX ORDER — DENOSUMAB 60 MG/ML
60 INJECTION SUBCUTANEOUS
Qty: 1 | Refills: 0 | Status: COMPLETED | OUTPATIENT
Start: 2021-12-13

## 2021-12-13 RX ADMIN — DENOSUMAB 60 MG/ML: 60 INJECTION SUBCUTANEOUS at 00:00

## 2022-03-16 ENCOUNTER — APPOINTMENT (OUTPATIENT)
Dept: UROGYNECOLOGY | Facility: CLINIC | Age: 87
End: 2022-03-16

## 2022-06-15 ENCOUNTER — APPOINTMENT (OUTPATIENT)
Dept: ENDOCRINOLOGY | Facility: CLINIC | Age: 87
End: 2022-06-15
Payer: MEDICARE

## 2022-06-15 VITALS
SYSTOLIC BLOOD PRESSURE: 110 MMHG | WEIGHT: 130 LBS | TEMPERATURE: 97.7 F | HEART RATE: 55 BPM | HEIGHT: 64 IN | BODY MASS INDEX: 22.2 KG/M2 | OXYGEN SATURATION: 98 % | RESPIRATION RATE: 17 BRPM | DIASTOLIC BLOOD PRESSURE: 52 MMHG

## 2022-06-15 PROCEDURE — 99214 OFFICE O/P EST MOD 30 MIN: CPT | Mod: 25

## 2022-06-15 PROCEDURE — 96372 THER/PROPH/DIAG INJ SC/IM: CPT

## 2022-06-15 RX ORDER — SULFAMETHOXAZOLE AND TRIMETHOPRIM 800; 160 MG/1; MG/1
800-160 TABLET ORAL
Qty: 6 | Refills: 0 | Status: DISCONTINUED | COMMUNITY
Start: 2021-06-24 | End: 2022-06-15

## 2022-06-15 RX ORDER — DENOSUMAB 60 MG/ML
60 INJECTION SUBCUTANEOUS
Qty: 1 | Refills: 0 | Status: COMPLETED | OUTPATIENT
Start: 2022-06-15

## 2022-06-15 RX ORDER — CYCLOBENZAPRINE HCL 5 MG
TABLET ORAL
Refills: 0 | Status: DISCONTINUED | COMMUNITY
End: 2022-06-15

## 2022-06-15 RX ADMIN — DENOSUMAB 60 MG/ML: 60 INJECTION SUBCUTANEOUS at 00:00

## 2022-06-15 NOTE — PHYSICAL EXAM
[Alert] : alert [Well Nourished] : well nourished [No Acute Distress] : no acute distress [Well Developed] : well developed [Normal Sclera/Conjunctiva] : normal sclera/conjunctiva [EOMI] : extra ocular movement intact [No Proptosis] : no proptosis [Normal Oropharynx] : the oropharynx was normal [Thyroid Not Enlarged] : the thyroid was not enlarged [No Respiratory Distress] : no respiratory distress [No Thyroid Nodules] : no palpable thyroid nodules [No Accessory Muscle Use] : no accessory muscle use [Clear to Auscultation] : lungs were clear to auscultation bilaterally [Normal S1, S2] : normal S1 and S2 [Normal Rate] : heart rate was normal [Regular Rhythm] : with a regular rhythm [No Edema] : no peripheral edema [Pedal Pulses Normal] : the pedal pulses are present [Normal Bowel Sounds] : normal bowel sounds [Not Tender] : non-tender [Not Distended] : not distended [Soft] : abdomen soft [No Spinal Tenderness] : no spinal tenderness [Normal Anterior Cervical Nodes] : no anterior cervical lymphadenopathy [Spine Straight] : spine straight [Kyphosis] : kyphosis present [No Stigmata of Cushings Syndrome] : no stigmata of Cushings Syndrome [Normal Gait] : normal gait [Normal Strength/Tone] : muscle strength and tone were normal [No Rash] : no rash [Acanthosis Nigricans] : no acanthosis nigricans [Normal Reflexes] : deep tendon reflexes were 2+ and symmetric [No Tremors] : no tremors [Oriented x3] : oriented to person, place, and time

## 2022-06-15 NOTE — ASSESSMENT
[Denosumab Therapy] : Risks  and benefits of denosumab therapy were discussed with the patient including eczema, cellulitis, osteonecrosis of the jaw and atypical femur fractures [FreeTextEntry1] : 89 y/o female with osteoporosis and hypothyroidism. \par \par 1. Osteoporosis: Previously treated with Fosamax, HRT, and Forteo (2 years). On Prolia since 2011. Tolerating well. No thigh pain, no interval fx. No ONJ. BMD 2013 stable. BMD 2017 stable although low with sl decrease in fem neck. BMD 9/2018 indicates stable hip density and improvement in the proximal radius. BMD 12/2020 c/w improving osteopenia in hip, stable osteoporosis in proximal radius. Continue Prolia, buy and bill. \par \par 2. Hypothyroidism: clinically and chemically euthyroid on LT4 100 mcg daily   No local neck pain. No dysphagia or dysphonia. No raciness, shakiness, heat/cold intolerance, tiredness, or fatigue. No palpitations, tremors, or sudden weight gain/loss.\par \par 3. Hypertension: Blood pressure well controlled on current medication. \par \par 4. Kyphosis: stable on exam.\par \par F/u in 6 months\par Labs:  ( May 2022) \par  Calcium 96\par creatine 1.02\par \par Bone density will be done next visit.  [Levothyroxine] : The patient was instructed to take Levothyroxine on an empty stomach, separate from vitamins, and wait at least 30 minutes before eating

## 2022-06-15 NOTE — ASSESSMENT
[Denosumab Therapy] : Risks  and benefits of denosumab therapy were discussed with the patient including eczema, cellulitis, osteonecrosis of the jaw and atypical femur fractures [Levothyroxine] : The patient was instructed to take Levothyroxine on an empty stomach, separate from vitamins, and wait at least 30 minutes before eating [FreeTextEntry1] : 89 y/o female with osteoporosis and hypothyroidism. \par \par 1. Osteoporosis: Previously treated with Fosamax, HRT, and Forteo (2 years). On Prolia since 2011. Tolerating well. No thigh pain, no interval fx. No ONJ. BMD 2013 stable. BMD 2017 stable although low with sl decrease in fem neck. BMD 9/2018 indicates stable hip density and improvement in the proximal radius. BMD 12/2020 c/w improving osteopenia in hip, stable osteoporosis in proximal radius. Continue Prolia, buy and bill. \par \par 2. Hypothyroidism: clinically and chemically euthyroid on LT4 100 mcg daily   No local neck pain. No dysphagia or dysphonia. No raciness, shakiness, heat/cold intolerance, tiredness, or fatigue. No palpitations, tremors, or sudden weight gain/loss.\par \par 3. Hypertension: Blood pressure well controlled on current medication. \par \par 4. Kyphosis: stable on exam.\par \par F/u in 6 months\par Labs:  ( May 2022) \par  Calcium 96\par Kreatine 1.02\par \par Bone density will be done next visit.

## 2022-06-15 NOTE — HISTORY OF PRESENT ILLNESS
[Alendronate (Fosomax)] : Alendronate [Estrogens (HRT)] : HRT [Teriparatide (Forteo)] : Teriparatide [Prolia (Denosumab)] : Prolia (Denosumab) [Regular Dental Follow-Up] : regular dental follow-up [Previous Fragility Fracture] : previous fragility fracture(s) [FreeTextEntry1] : No significant interval health changes. No interval surgery, hospitalizations, fractures, or change in medications.\par \par Prev on HRT then Fosamax then Forteo for 2 years. On Prolia since Nov 2011. Tolerating well. No thigh pain, no interval fx. Last DDS within past 6 months. No ONJ. Implant 3/2021, no complications. BMD 2015 stable. BMD 9/2017 stable, sl decrease fem neck. BMD 2018 improved in proximal radius. BMD 12/2020 c/w improving osteopenia in hip, stable osteoporosis in proximal radius. \par \par Thyroid: on Levothyroxine 100 mcg daily. No local neck pain. No dysphagia or dysphonia. No raciness, shakiness, heat/cold intolerance, tiredness, or fatigue. No palpitations, tremors, or sudden weight gain/loss. \par \par Sees urology for bladder dysfunction. [Disordered Eating] : no past or present history of disordered eating [Taking Steroids] : no past or present history of taking steroids [Kidney Stones] : no history of kidney stones [Hyperparathyroidism] : no hyperparathyroidism [de-identified] : prior comp fx

## 2022-06-15 NOTE — END OF VISIT
[FreeTextEntry2] : Patient will follow up in 6 months.  [FreeTextEntry3] : I, Enmanuel Borjas, authored this note working as a medical scribe for Dr. Foster.  06/07/2021.  2:30PM. This note was authored by the medical scribe for me. I have reviewed, edited, and revised the note as needed. I am in agreement with the exam findings, imaging findings, and treatment plan.  Nilson Foster MD

## 2022-06-15 NOTE — PROCEDURE
[FreeTextEntry1] : Exam Date: December 2, 2020\par Indication: Compared to 2018\par Lumbar spine 1-4  BMD: Not performed \par Total hip: -2.0  WHO Classification osteopenia +4.6%\par Femoral neck: -2.1  WHO Classification osteopenia +8.7%   \par Proximal wrist: -2.7  WHO Classification osteoporosis no significant change  \par \par Bone mineral density: 09/12/2018 \par Indication: vs.  2017 prior test showed bone loss\par Spine: not done\par Total hip: -2.3 osteopenia, no significant change \par Femoral neck: -2.5 osteoporosis, no significant change \par Proximal radius: -2.7 osteoporosis (+4.6%)\par \par bone mineral density September 2017\par Lumbar spine 1-4 Not performed\par Total hip: -2.3 osteopenia, no significant change \par Femoral neck: -2.7  osteoporosis, -4.1% not significant   \par Proximal wrist: -3.1 osteoporosis, no significant change  \par \par bone mineral density 7/1/15\par spine not performed to scoliosis\par Total hip -2.2, osteopenia, no significant change versus 2013\par Femoral neck -2.5, osteoporosis, no significant change versus 2013\par Proximal radius -3.1, osteoporosis, no significant change

## 2022-06-15 NOTE — END OF VISIT
[FreeTextEntry3] : This note was authored by the medical scribe for me. I have reviewed, edited, and revised the note as needed. I am in agreement with the exam findings, imaging findings, and treatment plan.  Nilson Foster MD

## 2022-06-15 NOTE — PHYSICAL EXAM
[Alert] : alert [Well Nourished] : well nourished [No Acute Distress] : no acute distress [Well Developed] : well developed [Normal Sclera/Conjunctiva] : normal sclera/conjunctiva [EOMI] : extra ocular movement intact [No Proptosis] : no proptosis [Thyroid Not Enlarged] : the thyroid was not enlarged [No Thyroid Nodules] : no palpable thyroid nodules [Clear to Auscultation] : lungs were clear to auscultation bilaterally [Normal S1, S2] : normal S1 and S2 [Normal Rate] : heart rate was normal [Regular Rhythm] : with a regular rhythm [No Edema] : no peripheral edema [Normal Bowel Sounds] : normal bowel sounds [Not Tender] : non-tender [Not Distended] : not distended [Soft] : abdomen soft [Normal Anterior Cervical Nodes] : no anterior cervical lymphadenopathy [No Spinal Tenderness] : no spinal tenderness [Kyphosis] : kyphosis present [No Stigmata of Cushings Syndrome] : no stigmata of Cushings Syndrome [Normal Gait] : normal gait [Normal Strength/Tone] : muscle strength and tone were normal [Normal Reflexes] : deep tendon reflexes were 2+ and symmetric [No Tremors] : no tremors [Oriented x3] : oriented to person, place, and time [Normal Oropharynx] : the oropharynx was normal [No Accessory Muscle Use] : no accessory muscle use [Pedal Pulses Normal] : the pedal pulses are present [No Respiratory Distress] : no respiratory distress [Spine Straight] : spine straight [No Rash] : no rash [Acanthosis Nigricans] : no acanthosis nigricans [de-identified] : left upper implant site clean

## 2022-06-15 NOTE — HISTORY OF PRESENT ILLNESS
[FreeTextEntry1] : No significant interval health changes. No interval surgery, hospitalizations, fractures, or change in medications.\par \par Prev on HRT then Fosamax then Forteo for 2 years. On Prolia since Nov 2011. Tolerating well. No thigh pain, no interval fx. Last DDS within past 6 months. No ONJ. Implant 3/2021, no complications. BMD 2015 stable. BMD 9/2017 stable, sl decrease fem neck. BMD 2018 improved in proximal radius. BMD 12/2020 c/w improving osteopenia in hip, stable osteoporosis in proximal radius. \par \par Thyroid: on Levothyroxine 100 mcg daily. No local neck pain. No dysphagia or dysphonia. No raciness, shakiness, heat/cold intolerance, tiredness, or fatigue. No palpitations, tremors, or sudden weight gain/loss. \par \par Sees urology for bladder dysfunction. [Alendronate (Fosomax)] : Alendronate [Estrogens (HRT)] : HRT [Teriparatide (Forteo)] : Teriparatide [Prolia (Denosumab)] : Prolia (Denosumab) [Disordered Eating] : no past or present history of disordered eating [Taking Steroids] : no past or present history of taking steroids [Kidney Stones] : no history of kidney stones [Hyperparathyroidism] : no hyperparathyroidism [Regular Dental Follow-Up] : regular dental follow-up [Previous Fragility Fracture] : previous fragility fracture(s) [de-identified] : prior comp fx

## 2022-08-24 ENCOUNTER — APPOINTMENT (OUTPATIENT)
Dept: UROGYNECOLOGY | Facility: CLINIC | Age: 87
End: 2022-08-24

## 2022-08-24 PROCEDURE — 51701 INSERT BLADDER CATHETER: CPT

## 2022-08-24 PROCEDURE — 99214 OFFICE O/P EST MOD 30 MIN: CPT | Mod: 25

## 2022-08-24 PROCEDURE — 81003 URINALYSIS AUTO W/O SCOPE: CPT | Mod: QW

## 2022-08-24 NOTE — HISTORY OF PRESENT ILLNESS
[Cystocele (Obstetric)] : no [Rectal Prolapse] : no [Unable To Restrain Bowel Movement] : moderate [Urinary Frequency] : no [x3+] : nocturia three or more  times a night [de-identified] : large volume  [de-identified] : 1-3x  [FreeTextEntry1] : Elisabeth is a 87yo with incomplete emptying and OAB who was last seen in October 2021. She had previously been on Flomax 0.8mg but stopped after about 6 months.  She reports always feeling some residual urine leftover after voiding. For her OAB, she was considering sacral neuromodulation but was started on Myrbetriq 25mg by her urologist. She has been on this for about 3 months and does feel some improvement. She reports during the day she uses 1-3 pads and during the night an additional 1-3 pads. \par \par She reports having recurrent UTIs every few months and is also on methenamine hippurate for prevention. She reports last antibiotic course was 6 weeks ago. \par

## 2022-08-24 NOTE — PHYSICAL EXAM
[Chaperone Present] : A chaperone was present in the examining room during all aspects of the physical examination [No Acute Distress] : in no acute distress [Well developed] : well developed [Well Nourished] : ~L well nourished [Warm and Dry] : was warm and dry to touch [Normal Gait] : gait was normal [Labia Majora] : were normal [Labia Minora] : were normal [Normal Appearance] : general appearance was normal [Normal] : no abnormalities [Tenderness] : ~T no ~M abdominal tenderness observed [Distended] : not distended

## 2022-08-24 NOTE — DISCUSSION/SUMMARY
[FreeTextEntry1] : We discussed with the patient and her daughter, Ivania, about the above findings. We discussed the incomplete emptying and treatment options including do nothing, self catherization, medication with restarting Flomax or sacral neuromodulation. We reviewed that if she decides to do nothing, she could continue to have recurrent UTI and build antibiotic resistance with worsening infection that could lead to increased morbidity. We reviewed in detail sacral neuromodulation with stage 1 procedure in the OR under sedation followed by stage 2 in the OR if she has improvement in urination during this test period. We discussed recommendation to proceed with Flomax, given success in the past. She was provided drug information hand out. \par \par We reviewed that Myrbetriq may potentially be increasing her urinary retention and would recommend discontinuing this since she is not having any relief or improvement. If she has success with Flomax then we could repeat a trial of Myrbetriq. \par \par We reviewed use of vaginal estrogen to help to prevent UTI. We discussed risks and benefits of this and she is interested in proceeding with this. She prefers vaginal tablet over cream. \par \par UDip revealed \par \par [facility] handout on vaginal estrogen and sacral neuromodulation was provided. Drug ed on Tamsulosin also provided. \par \par RTO in 1 month.  All questions answered.

## 2022-08-26 ENCOUNTER — APPOINTMENT (OUTPATIENT)
Dept: MAMMOGRAPHY | Facility: IMAGING CENTER | Age: 87
End: 2022-08-26

## 2022-08-26 ENCOUNTER — APPOINTMENT (OUTPATIENT)
Dept: ULTRASOUND IMAGING | Facility: IMAGING CENTER | Age: 87
End: 2022-08-26

## 2022-08-26 ENCOUNTER — RESULT REVIEW (OUTPATIENT)
Age: 87
End: 2022-08-26

## 2022-08-26 ENCOUNTER — OUTPATIENT (OUTPATIENT)
Dept: OUTPATIENT SERVICES | Facility: HOSPITAL | Age: 87
LOS: 1 days | End: 2022-08-26
Payer: MEDICARE

## 2022-08-26 DIAGNOSIS — Z00.00 ENCOUNTER FOR GENERAL ADULT MEDICAL EXAMINATION WITHOUT ABNORMAL FINDINGS: ICD-10-CM

## 2022-08-26 PROCEDURE — 76641 ULTRASOUND BREAST COMPLETE: CPT | Mod: 26,50

## 2022-08-26 PROCEDURE — 77067 SCR MAMMO BI INCL CAD: CPT | Mod: 26

## 2022-08-26 PROCEDURE — 77063 BREAST TOMOSYNTHESIS BI: CPT | Mod: 26

## 2022-08-26 PROCEDURE — 76641 ULTRASOUND BREAST COMPLETE: CPT

## 2022-08-26 PROCEDURE — 77063 BREAST TOMOSYNTHESIS BI: CPT

## 2022-08-26 PROCEDURE — 77067 SCR MAMMO BI INCL CAD: CPT

## 2022-08-30 LAB
APPEARANCE: CLEAR
BACTERIA: NEGATIVE
BILIRUBIN URINE: NEGATIVE
BLOOD URINE: ABNORMAL
COLOR: NORMAL
GLUCOSE QUALITATIVE U: NEGATIVE
HYALINE CASTS: 0 /LPF
KETONES URINE: NEGATIVE
LEUKOCYTE ESTERASE URINE: ABNORMAL
MICROSCOPIC-UA: NORMAL
NITRITE URINE: POSITIVE
PH URINE: 6
PROTEIN URINE: NORMAL
RED BLOOD CELLS URINE: 5 /HPF
SPECIFIC GRAVITY URINE: 1.01
SQUAMOUS EPITHELIAL CELLS: 5 /HPF
UROBILINOGEN URINE: NORMAL
WHITE BLOOD CELLS URINE: 250 /HPF

## 2022-09-29 ENCOUNTER — APPOINTMENT (OUTPATIENT)
Dept: UROGYNECOLOGY | Facility: CLINIC | Age: 87
End: 2022-09-29

## 2022-09-29 LAB
BILIRUB UR QL STRIP: NEGATIVE
CLARITY UR: NORMAL
COLLECTION METHOD: NORMAL
GLUCOSE UR-MCNC: NEGATIVE
HCG UR QL: 0.2 EU/DL
HGB UR QL STRIP.AUTO: NEGATIVE
KETONES UR-MCNC: NEGATIVE
LEUKOCYTE ESTERASE UR QL STRIP: NORMAL
NITRITE UR QL STRIP: NEGATIVE
PH UR STRIP: 6
PROT UR STRIP-MCNC: NORMAL
SP GR UR STRIP: 1.02

## 2022-09-29 PROCEDURE — 51701 INSERT BLADDER CATHETER: CPT

## 2022-09-29 PROCEDURE — 81003 URINALYSIS AUTO W/O SCOPE: CPT | Mod: QW

## 2022-09-29 PROCEDURE — 99214 OFFICE O/P EST MOD 30 MIN: CPT | Mod: 25

## 2022-09-29 NOTE — PROCEDURE
[Straight Catheterization] : insertion of a straight catheter [Urinary Tract Infection] : a urinary tract infection [Patient] : the patient [Allergies Reviewed] : Allergies reviewed [___ Fr Straight Tip] : a [unfilled] in Brazilian straight tip catheter [None] : there were no complications with the catheter insertion [Clear] : clear [Culture] : culture [Urinalysis] : urinalysis [No Complications] : no complications [Tolerated Well] : the patient tolerated the procedure well [Post procedure instructions and information given] : Post procedure instructions and information were given and reviewed with patient.

## 2022-09-29 NOTE — PROCEDURE
[Straight Catheterization] : insertion of a straight catheter [Urinary Tract Infection] : a urinary tract infection [Patient] : the patient [Allergies Reviewed] : Allergies reviewed [___ Fr Straight Tip] : a [unfilled] in [place] straight tip catheter [None] : there were no complications with the catheter insertion [Clear] : clear [Culture] : culture [Urinalysis] : urinalysis [No Complications] : no complications [Tolerated Well] : the patient tolerated the procedure well [Post procedure instructions and information given] : Post procedure instructions and information were given and reviewed with patient.

## 2022-09-30 NOTE — PHYSICAL EXAM
[Chaperone Present] : A chaperone was present in the examining room during all aspects of the physical examination [No Acute Distress] : in no acute distress [Well developed] : well developed [Well Nourished] : ~L well nourished [Oriented x3] : disoriented to person, place, or time [Normal Memory] : ~T memory was ~L impaired [Normal Mood/Affect] : mood and affect are normal [None] : no CVA tenderness [Warm and Dry] : was warm and dry to touch [Normal Gait] : gait was normal [Vulvar Atrophy] : vulvar atrophy [Labia Majora] : were normal [Labia Minora] : were normal [Normal] : was normal [Normal Appearance] : general appearance was normal [Atrophy] : atrophy [Dry Mucosa] : dry mucosa [No Bleeding] : there was no active vaginal bleeding [Post Void Residual ____ml] : post void residual was [unfilled] ml [Tenderness] : ~T no ~M abdominal tenderness observed [Distended] : not distended

## 2022-09-30 NOTE — DISCUSSION/SUMMARY
[FreeTextEntry1] : Recurrent UTI:\par -UDip in office shows 30 mg/dL protein and small leukocytes.\par -UA, C&S sent to lab\par -Will treat with Cipro 250 mg q 12 hours x 3 days\par -Advised pt to call the office if symptoms persist or worsen.\par -Advised pt to take Vitamin C 1000 mg daily while she is taking the Methenamine\par -Advised pt to not take Methenamine while she is taking Cipro.\par -Discussed use of probiotic while taking antibiotics\par \par Vaginal atrophy:\par -Discussed the importance of using the vaginal estrogen as prescribed, risks and benefits reviewed.\par -Precautions and instructions reviewed\par -Reviewed pericare\par \par Advised pt to call the office with any questions or concerns\par Will f/u in 2 months or sooner if needed

## 2022-09-30 NOTE — HISTORY OF PRESENT ILLNESS
[Cystocele (Obstetric)] : no [Rectal Prolapse] : no [Unable To Restrain Bowel Movement] : moderate [Urinary Frequency] : no [x3+] : nocturia three or more  times a night [de-identified] : large volume  [de-identified] : 1-3x  [FreeTextEntry1] : Elisabeth is an 89 yo with h/o incomplete emptying and OAB who was last seen in 8/29/22.  She also has a h/o recurrent UTI's. For incomplete emptying of bladder she is taking Tamsulosin. She is on Methenamine Hippurate for prevention of UTI's. She was also started on vaginal estrogen at her last visit, but reports not using it. Today she reports increased urinary frequency, leakage and vaginal burning. She denies any fever, chills or back pain.\par \par \par She was treated for a UTI at her last visit in August with Levaquin.\par

## 2022-09-30 NOTE — HISTORY OF PRESENT ILLNESS
[Cystocele (Obstetric)] : no [Rectal Prolapse] : no [Unable To Restrain Bowel Movement] : moderate [Urinary Frequency] : no [x3+] : nocturia three or more  times a night [de-identified] : large volume  [de-identified] : 1-3x  [FreeTextEntry1] : Elisabeth is an 89 yo with h/o incomplete emptying and OAB who was last seen in 8/29/22.  She also has a h/o recurrent UTI's. For incomplete emptying of bladder she is taking Tamsulosin. She is on Methenamine Hippurate for prevention of UTI's. She was also started on vaginal estrogen at her last visit, but reports not using it. Today she reports increased urinary frequency, leakage and vaginal burning. She denies any fever, chills or back pain.\par \par \par She was treated for a UTI at her last visit in August with Levaquin.\par

## 2022-11-15 ENCOUNTER — APPOINTMENT (OUTPATIENT)
Dept: UROGYNECOLOGY | Facility: CLINIC | Age: 87
End: 2022-11-15

## 2022-11-15 DIAGNOSIS — N95.2 POSTMENOPAUSAL ATROPHIC VAGINITIS: ICD-10-CM

## 2022-11-15 LAB
APPEARANCE: CLEAR
BACTERIA: NEGATIVE
BILIRUB UR QL STRIP: NEGATIVE
BILIRUBIN URINE: NEGATIVE
BLOOD URINE: NEGATIVE
CLARITY UR: CLEAR
COLLECTION METHOD: NORMAL
COLOR: NORMAL
GLUCOSE QUALITATIVE U: NEGATIVE
GLUCOSE UR-MCNC: NEGATIVE
HCG UR QL: 0.2 EU/DL
HGB UR QL STRIP.AUTO: NEGATIVE
HYALINE CASTS: 3 /LPF
KETONES UR-MCNC: NEGATIVE
KETONES URINE: NEGATIVE
LEUKOCYTE ESTERASE UR QL STRIP: NORMAL
LEUKOCYTE ESTERASE URINE: ABNORMAL
MICROSCOPIC-UA: NORMAL
NITRITE UR QL STRIP: NEGATIVE
NITRITE URINE: NEGATIVE
PH UR STRIP: 6
PH URINE: 6.5
PROT UR STRIP-MCNC: NORMAL
PROTEIN URINE: ABNORMAL
RED BLOOD CELLS URINE: 2 /HPF
SP GR UR STRIP: 1.02
SPECIFIC GRAVITY URINE: 1.02
SQUAMOUS EPITHELIAL CELLS: 11 /HPF
UROBILINOGEN URINE: NORMAL
WHITE BLOOD CELLS URINE: 78 /HPF

## 2022-11-15 PROCEDURE — 99214 OFFICE O/P EST MOD 30 MIN: CPT | Mod: 25

## 2022-11-15 PROCEDURE — 81003 URINALYSIS AUTO W/O SCOPE: CPT | Mod: QW

## 2022-11-15 PROCEDURE — 51701 INSERT BLADDER CATHETER: CPT

## 2022-11-15 NOTE — PROCEDURE
[Straight Catheterization] : insertion of a straight catheter [Urinary Tract Infection] : a urinary tract infection [Patient] : the patient [Allergies Reviewed] : Allergies reviewed [___ Fr Straight Tip] : a [unfilled] in [place] straight tip catheter [None] : there were no complications with the catheter insertion [Clear] : clear [Culture] : culture [Urinalysis] : urinalysis [No Complications] : no complications [Tolerated Well] : the patient tolerated the procedure well [Post procedure instructions and information given] : Post procedure instructions and information were given and reviewed with patient.

## 2022-11-16 NOTE — HISTORY OF PRESENT ILLNESS
[FreeTextEntry1] : Elisabeth is an 88 yo with h/o incomplete emptying, OAB and recurrent UTI's. For incomplete emptying of bladder she is taking Tamsulosin. She is on Methenamine Hippurate for prevention of UTI's. She was also started on vaginal estrogen, but reports not using it. In September her UA showed large leukocytes, 11 epithelial cells, 78 U WBC and protein. Dr. Rodriguez advised  that she follow up for UCA in one month.\par \par She has no acute complaints today.\par \par \par

## 2022-11-16 NOTE — PHYSICAL EXAM
[Chaperone Present] : A chaperone was present in the examining room during all aspects of the physical examination [No Acute Distress] : in no acute distress [Well developed] : well developed [Well Nourished] : ~L well nourished [Warm and Dry] : was warm and dry to touch [Normal Gait] : gait was normal [Labia Majora] : were normal [Labia Minora] : were normal [Normal Appearance] : general appearance was normal [Normal] : normal [Post Void Residual ____ml] : post void residual was [unfilled] ml [Oriented x3] : oriented to person, place, and time [Normal Mood/Affect] : mood and affect are normal [None] : no CVA tenderness [Anxiety] : patient is not anxious [Tenderness] : ~T no ~M abdominal tenderness observed [Distended] : not distended

## 2022-11-16 NOTE — DISCUSSION/SUMMARY
[FreeTextEntry1] : Recurrent UTI:\par -UDip in office shows trace protein and small leukocytes.\par -UA, C&S sent to lab\par -Advised pt to continue taking Methenamine and Vitamin C.\par -Discussed use of probiotic while taking antibiotics\par \par Vaginal atrophy:\par -Discussed the importance of using the vaginal estrogen as prescribed, risks and benefits reviewed.\par -Precautions and instructions reviewed\par -Reviewed pericare\par \par Advised pt to call the office with any questions or concerns\par Will f/u 11/30 or sooner if needed.

## 2022-11-17 LAB — BACTERIA UR CULT: NORMAL

## 2022-11-30 ENCOUNTER — APPOINTMENT (OUTPATIENT)
Dept: UROGYNECOLOGY | Facility: CLINIC | Age: 87
End: 2022-11-30

## 2022-11-30 LAB
APPEARANCE: ABNORMAL
BACTERIA: NEGATIVE
BILIRUBIN URINE: NEGATIVE
BLOOD URINE: NEGATIVE
COLOR: NORMAL
GLUCOSE QUALITATIVE U: NEGATIVE
HYALINE CASTS: 0 /LPF
KETONES URINE: NEGATIVE
LEUKOCYTE ESTERASE URINE: ABNORMAL
MICROSCOPIC-UA: NORMAL
NITRITE URINE: NEGATIVE
PH URINE: 6
PROTEIN URINE: NORMAL
RED BLOOD CELLS URINE: 0 /HPF
SPECIFIC GRAVITY URINE: 1.01
SQUAMOUS EPITHELIAL CELLS: 22 /HPF
URINE COMMENTS: NORMAL
UROBILINOGEN URINE: NORMAL
WHITE BLOOD CELLS URINE: 45 /HPF

## 2022-12-12 ENCOUNTER — APPOINTMENT (OUTPATIENT)
Dept: ULTRASOUND IMAGING | Facility: CLINIC | Age: 87
End: 2022-12-12

## 2022-12-12 ENCOUNTER — OUTPATIENT (OUTPATIENT)
Dept: OUTPATIENT SERVICES | Facility: HOSPITAL | Age: 87
LOS: 1 days | End: 2022-12-12
Payer: MEDICARE

## 2022-12-12 ENCOUNTER — RESULT REVIEW (OUTPATIENT)
Age: 87
End: 2022-12-12

## 2022-12-12 DIAGNOSIS — N39.0 URINARY TRACT INFECTION, SITE NOT SPECIFIED: ICD-10-CM

## 2022-12-12 PROCEDURE — 76770 US EXAM ABDO BACK WALL COMP: CPT | Mod: 26

## 2022-12-12 PROCEDURE — 76770 US EXAM ABDO BACK WALL COMP: CPT

## 2022-12-30 ENCOUNTER — APPOINTMENT (OUTPATIENT)
Dept: ENDOCRINOLOGY | Facility: CLINIC | Age: 87
End: 2022-12-30
Payer: MEDICARE

## 2022-12-30 ENCOUNTER — APPOINTMENT (OUTPATIENT)
Dept: ENDOCRINOLOGY | Facility: CLINIC | Age: 87
End: 2022-12-30

## 2022-12-30 VITALS
DIASTOLIC BLOOD PRESSURE: 58 MMHG | WEIGHT: 125 LBS | SYSTOLIC BLOOD PRESSURE: 128 MMHG | OXYGEN SATURATION: 99 % | BODY MASS INDEX: 21.46 KG/M2 | HEART RATE: 57 BPM

## 2022-12-30 PROCEDURE — 96372 THER/PROPH/DIAG INJ SC/IM: CPT

## 2022-12-30 PROCEDURE — ZZZZZ: CPT

## 2022-12-30 PROCEDURE — 99214 OFFICE O/P EST MOD 30 MIN: CPT | Mod: 25

## 2022-12-30 RX ORDER — DENOSUMAB 60 MG/ML
60 INJECTION SUBCUTANEOUS
Qty: 1 | Refills: 0 | Status: COMPLETED | OUTPATIENT
Start: 2022-12-30

## 2022-12-30 RX ADMIN — DENOSUMAB 60 MG/ML: 60 INJECTION SUBCUTANEOUS at 00:00

## 2022-12-30 NOTE — HISTORY OF PRESENT ILLNESS
[Alendronate (Fosomax)] : Alendronate [Estrogens (HRT)] : HRT [Teriparatide (Forteo)] : Teriparatide [Prolia (Denosumab)] : Prolia (Denosumab) [Regular Dental Follow-Up] : regular dental follow-up [Previous Fragility Fracture] : previous fragility fracture(s) [FreeTextEntry1] : Patient returns for a follow up visit for osteoporosis. Pt reports having several falls. No fractures. \par \par Prev on HRT then Fosamax then Forteo for 2 years. On Prolia since Nov 2011. Tolerating well. No thigh pain, no interval fx. Last DDS within past 6 months. No ONJ. Implant 3/2021, no complications. BMD 2015 stable. BMD 9/2017 stable, sl decrease fem neck. BMD 2018 improved in proximal radius. BMD 12/2020 c/w improving osteopenia in hip, stable osteoporosis in proximal radius. \par The patient had a dental implant many months ago awaiting final placement of crown.\par Thyroid: on Levothyroxine 100 mcg daily. No local neck pain. No dysphagia or dysphonia. No raciness, shakiness, heat/cold intolerance, tiredness, or fatigue. No palpitations, tremors, or sudden weight gain/loss. \par \par Sees urology for bladder dysfunction. [Disordered Eating] : no past or present history of disordered eating [Taking Steroids] : no past or present history of taking steroids [Kidney Stones] : no history of kidney stones [Hyperparathyroidism] : no hyperparathyroidism [de-identified] : prior comp fx

## 2022-12-30 NOTE — ASSESSMENT
[Denosumab Therapy] : Risks  and benefits of denosumab therapy were discussed with the patient including eczema, cellulitis, osteonecrosis of the jaw and atypical femur fractures [Levothyroxine] : The patient was instructed to take Levothyroxine on an empty stomach, separate from vitamins, and wait at least 30 minutes before eating [FreeTextEntry1] : 89 y/o female with osteoporosis and hypothyroidism. \par \par 1. Osteoporosis: Previously treated with Fosamax, HRT, and Forteo (2 years). On Prolia since 2011. Tolerating well. No thigh pain, no interval fx. No ONJ. BMD 2013 stable. BMD 2017 stable although low with sl decrease in fem neck. BMD 9/2018 indicates stable hip density and improvement in the proximal radius. BMD 12/2020 c/w improving osteopenia in hip, stable osteoporosis in proximal radius. Continue Prolia, buy and bill. \par \par 2. Hypothyroidism: clinically and chemically euthyroid on LT4 100 mcg daily   No local neck pain. No dysphagia or dysphonia. No raciness, shakiness, heat/cold intolerance, tiredness, or fatigue. No palpitations, tremors, or sudden weight gain/loss.n. \par \par 3. Kyphosis: stable on exam.\par \par Pt reports having some falls. No fractures. I discussed that pt needs to consider a can or walker for support. \par \par F/u in 6 months w BMD .  Bone density not available today.\par \par \par

## 2022-12-30 NOTE — PHYSICAL EXAM
[Alert] : alert [Well Nourished] : well nourished [No Acute Distress] : no acute distress [Well Developed] : well developed [Normal Sclera/Conjunctiva] : normal sclera/conjunctiva [EOMI] : extra ocular movement intact [No Proptosis] : no proptosis [Normal Oropharynx] : the oropharynx was normal [Thyroid Not Enlarged] : the thyroid was not enlarged [No Thyroid Nodules] : no palpable thyroid nodules [No Respiratory Distress] : no respiratory distress [Clear to Auscultation] : lungs were clear to auscultation bilaterally [Normal S1, S2] : normal S1 and S2 [Normal Rate] : heart rate was normal [Regular Rhythm] : with a regular rhythm [No Edema] : no peripheral edema [Normal Bowel Sounds] : normal bowel sounds [Not Tender] : non-tender [Not Distended] : not distended [Soft] : abdomen soft [Normal Anterior Cervical Nodes] : no anterior cervical lymphadenopathy [No Spinal Tenderness] : no spinal tenderness [Spine Straight] : spine straight [Kyphosis] : kyphosis present [No Stigmata of Cushings Syndrome] : no stigmata of Cushings Syndrome [Normal Gait] : normal gait [No Rash] : no rash [Normal Reflexes] : deep tendon reflexes were 2+ and symmetric [No Tremors] : no tremors [Oriented x3] : oriented to person, place, and time [Acanthosis Nigricans] : no acanthosis nigricans [de-identified] : left upper canine site clean  [de-identified] : Unsteady gait

## 2022-12-30 NOTE — END OF VISIT
[FreeTextEntry3] : This note was written by Jacklyn Castro on ( December 30, 2022) acting as a medical scribe for Dr. Foster This note was authored by the medical scribe for me. I have reviewed, edited, and revised the note as needed. I am in agreement with the exam findings, imaging findings, and treatment plan.  Nilson Foster MD

## 2023-01-18 ENCOUNTER — APPOINTMENT (OUTPATIENT)
Dept: UROGYNECOLOGY | Facility: CLINIC | Age: 88
End: 2023-01-18
Payer: MEDICARE

## 2023-01-18 VITALS
WEIGHT: 125 LBS | DIASTOLIC BLOOD PRESSURE: 80 MMHG | SYSTOLIC BLOOD PRESSURE: 120 MMHG | HEIGHT: 64 IN | BODY MASS INDEX: 21.34 KG/M2

## 2023-01-18 DIAGNOSIS — N13.30 UNSPECIFIED HYDRONEPHROSIS: ICD-10-CM

## 2023-01-18 LAB
BILIRUB UR QL STRIP: NORMAL
CLARITY UR: NORMAL
COLLECTION METHOD: NORMAL
GLUCOSE UR-MCNC: NORMAL
HCG UR QL: 0.2 EU/DL
HGB UR QL STRIP.AUTO: NORMAL
KETONES UR-MCNC: NORMAL
LEUKOCYTE ESTERASE UR QL STRIP: NORMAL
NITRITE UR QL STRIP: POSITIVE
PH UR STRIP: 5.5
PROT UR STRIP-MCNC: 30
SP GR UR STRIP: 1.01

## 2023-01-18 PROCEDURE — 99214 OFFICE O/P EST MOD 30 MIN: CPT

## 2023-01-18 PROCEDURE — 81003 URINALYSIS AUTO W/O SCOPE: CPT | Mod: QW

## 2023-01-18 NOTE — HISTORY OF PRESENT ILLNESS
[FreeTextEntry1] : Patient returns today for a cystoscopy.  Her urine dipstick was positive for protein nitrates and leukocytes and therefore a catheterized urine specimen was performed.  She also had a renal bladder ultrasound in December which showed mild to moderate bilateral hydronephrosis.  She is currently on tamsulosin 4 mg.\par pt denies dysuria.

## 2023-01-18 NOTE — DISCUSSION/SUMMARY
[FreeTextEntry1] : I reviewed the above findings with the patient.  We discussed possible bacteriuria again and we also discussed her incomplete bladder emptying which puts her at significant risk as she does have bilateral hydronephrosis at this point in time.  We discussed learning self-catheterization will she return for such in the next several days.  We will also increase her dose of tamsulosin.  We discussed returning for urodynamic testing and rescheduling the cystoscopy.  All questions were answered.

## 2023-01-18 NOTE — PROCEDURE
[FreeTextEntry1] : Horizon urine was performed.  Bladder volume was 300 mL.  Urine dipstick positive for nitrites and leukocytes as well as protein and small blood.

## 2023-01-18 NOTE — LETTER BODY
[Dear  ___] : Dear  [unfilled], [I had the pleasure of evaluating your patient, [unfilled]. Thank you for referring Ms. [unfilled] for consultation for ___] : I had the pleasure of evaluating your patient, [unfilled]. Thank you for referring Ms. [unfilled] for consultation for [unfilled]. [Attached please find my note.] : Attached please find my note. [Thank you very much for allowing me to participate in the care of this patient. If you have any questions, please do not hesitate to contact me] : Thank you very much for allowing me to participate in the care of this patient. If you have any questions, please do not hesitate to contact me. [DrIleana  ___] : Dr. MISTRY

## 2023-01-22 LAB
APPEARANCE: ABNORMAL
BACTERIA: ABNORMAL
BILIRUBIN URINE: NEGATIVE
BLOOD URINE: ABNORMAL
COLOR: YELLOW
GLUCOSE QUALITATIVE U: NEGATIVE
HYALINE CASTS: 1 /LPF
KETONES URINE: NEGATIVE
LEUKOCYTE ESTERASE URINE: ABNORMAL
MICROSCOPIC-UA: NORMAL
NITRITE URINE: POSITIVE
PH URINE: 6
PROTEIN URINE: ABNORMAL
RED BLOOD CELLS URINE: 11 /HPF
SPECIFIC GRAVITY URINE: 1.02
SQUAMOUS EPITHELIAL CELLS: 2 /HPF
UROBILINOGEN URINE: NORMAL
WHITE BLOOD CELLS URINE: >720 /HPF

## 2023-01-26 ENCOUNTER — APPOINTMENT (OUTPATIENT)
Dept: UROGYNECOLOGY | Facility: CLINIC | Age: 88
End: 2023-01-26
Payer: MEDICARE

## 2023-01-26 ENCOUNTER — APPOINTMENT (OUTPATIENT)
Dept: UROGYNECOLOGY | Facility: CLINIC | Age: 88
End: 2023-01-26

## 2023-01-26 LAB
BILIRUB UR QL STRIP: NEGATIVE
CLARITY UR: NORMAL
COLLECTION METHOD: NORMAL
GLUCOSE UR-MCNC: NEGATIVE
HCG UR QL: 0.2 EU/DL
HGB UR QL STRIP.AUTO: NORMAL
KETONES UR-MCNC: NEGATIVE
LEUKOCYTE ESTERASE UR QL STRIP: NORMAL
NITRITE UR QL STRIP: NEGATIVE
PH UR STRIP: 5.5
PROT UR STRIP-MCNC: NORMAL
SP GR UR STRIP: 1.01

## 2023-01-26 PROCEDURE — 81003 URINALYSIS AUTO W/O SCOPE: CPT | Mod: QW

## 2023-01-26 PROCEDURE — 99214 OFFICE O/P EST MOD 30 MIN: CPT | Mod: 25

## 2023-01-26 PROCEDURE — 51701 INSERT BLADDER CATHETER: CPT

## 2023-01-26 NOTE — HISTORY OF PRESENT ILLNESS
[FreeTextEntry1] : Elisabeth is a 88 y/o that presents to the office for UDS. She was treated on 1/18 for a UTI with Bactrim. She reports cloudy urine. She denies any urinary urgency, frequency, fever, chills or back pain.

## 2023-01-26 NOTE — DISCUSSION/SUMMARY
[FreeTextEntry1] : Acute UTI:\par -Udip shows moderate blood, trace protein, small leuks.\par -UA, CS sent. Tx empirically with Nitrofurantoin x 5 days.\par -Advised her to call the office if she develops any fever, chills or back pain.\par \par Urinary retention: \par -Pt will continue to take Tamlosin\par -Pt will learn how to self cath at next appt.\par \par Will f/u on 2/6 or sooner if needed\par Advised to call the office with any questions or concerns

## 2023-02-01 LAB
APPEARANCE: ABNORMAL
BACTERIA: NEGATIVE
BILIRUBIN URINE: NEGATIVE
BLOOD URINE: ABNORMAL
COLOR: YELLOW
GLUCOSE QUALITATIVE U: NEGATIVE
HYALINE CASTS: 0 /LPF
KETONES URINE: NEGATIVE
LEUKOCYTE ESTERASE URINE: ABNORMAL
MICROSCOPIC-UA: NORMAL
NITRITE URINE: NEGATIVE
PH URINE: 6
PROTEIN URINE: ABNORMAL
RED BLOOD CELLS URINE: 7 /HPF
SPECIFIC GRAVITY URINE: 1.01
SQUAMOUS EPITHELIAL CELLS: >27 /HPF
UROBILINOGEN URINE: NORMAL
WHITE BLOOD CELLS URINE: 269 /HPF

## 2023-02-06 ENCOUNTER — APPOINTMENT (OUTPATIENT)
Dept: UROGYNECOLOGY | Facility: CLINIC | Age: 88
End: 2023-02-06
Payer: MEDICARE

## 2023-02-06 LAB
BILIRUB UR QL STRIP: NORMAL
CLARITY UR: NORMAL
COLLECTION METHOD: NORMAL
GLUCOSE UR-MCNC: NORMAL
HCG UR QL: 0.2 EU/DL
HGB UR QL STRIP.AUTO: NORMAL
KETONES UR-MCNC: NORMAL
LEUKOCYTE ESTERASE UR QL STRIP: NORMAL
NITRITE UR QL STRIP: NORMAL
PH UR STRIP: 5.5
PROT UR STRIP-MCNC: NORMAL
SP GR UR STRIP: 1.01

## 2023-02-06 PROCEDURE — 99214 OFFICE O/P EST MOD 30 MIN: CPT | Mod: 25

## 2023-02-06 PROCEDURE — 51701 INSERT BLADDER CATHETER: CPT

## 2023-02-06 PROCEDURE — 81003 URINALYSIS AUTO W/O SCOPE: CPT | Mod: QW

## 2023-02-06 NOTE — DISCUSSION/SUMMARY
[FreeTextEntry1] : OAB/UI/UI without sensory:\par -Urine dip in office - moderate blood and small leuks.\par -Formal UA and CS sent.\par -PT will await for results before any treatment.\par -She will continue on the Tamusolin.\par -Bend with void.  Lean with void.  Double void if necessary.\par \par PT made appt for UDS 3/6/2023 and Cysto appt 3/1/2023.  If pt have any problems/concern to call office.  PT verbalizes understanding and agrees.

## 2023-02-06 NOTE — PHYSICAL EXAM
[No Acute Distress] : in no acute distress [Well developed] : well developed [Well Nourished] : ~L well nourished [Good Hygeine] : demonstrates good hygeine [Oriented x3] : oriented to person, place, and time [Normal Memory] : ~T memory was ~L unimpaired [Normal Mood/Affect] : mood and affect are normal [Soft, Nontender] : the abdomen was soft and nontender [Warm and Dry] : was warm and dry to touch [Normal Gait] : gait was normal [Vulvar Atrophy] : vulvar atrophy [Labia Majora] : were normal

## 2023-02-06 NOTE — HISTORY OF PRESENT ILLNESS
[FreeTextEntry1] : Elisabeth, 89y/o presents to the office for PVR and urine check.  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  She has also been on the Tamsulin 0.4mg.  She was told she have some urinary retention and was to RTO for UDS, which she have not made an appt yet.  She has appt for cysto on 3/1/2023.  PT denies any fever, chills, back pain.  Last urine, no growth, but there were turbid urine, large leuks, epithelial cells, WBC, RBCs, and protein.  She was told to learn self-cath teaching but will do the UDS first and cysto appt.

## 2023-02-06 NOTE — PROCEDURE
[Straight Catheterization] : insertion of a straight catheter [Urinary Tract Infection] : a urinary tract infection [Urgent Incontinence] : urgent incontinence [Urinary Frequency] : urinary frequency [Patient] : the patient [___ Fr Straight Tip] : a [unfilled] in [place] straight tip catheter [Cloudy] : cloudy [Culture] : culture [Urinalysis] : urinalysis [No Complications] : no complications [Tolerated Well] : the patient tolerated the procedure well [Post procedure instructions and information given] : Post procedure instructions and information were given and reviewed with patient. [de-identified] : PVR 200mL [FreeTextEntry1] : Urine obtained via catheter, 14FR sterile straight catheter, PVR 200mL, No CVAT.

## 2023-02-15 ENCOUNTER — NON-APPOINTMENT (OUTPATIENT)
Age: 88
End: 2023-02-15

## 2023-02-15 LAB
APPEARANCE: ABNORMAL
BACTERIA: ABNORMAL
BILIRUBIN URINE: NEGATIVE
BLOOD URINE: ABNORMAL
COLOR: NORMAL
GLUCOSE QUALITATIVE U: NEGATIVE
HYALINE CASTS: 0 /LPF
KETONES URINE: NEGATIVE
LEUKOCYTE ESTERASE URINE: ABNORMAL
MICROSCOPIC-UA: NORMAL
NITRITE URINE: NEGATIVE
PH URINE: 6
PROTEIN URINE: NORMAL
RED BLOOD CELLS URINE: 66 /HPF
SPECIFIC GRAVITY URINE: 1.01
SQUAMOUS EPITHELIAL CELLS: 12 /HPF
URINE COMMENTS: NORMAL
UROBILINOGEN URINE: NORMAL
WHITE BLOOD CELLS URINE: 122 /HPF

## 2023-03-01 ENCOUNTER — OUTPATIENT (OUTPATIENT)
Dept: OUTPATIENT SERVICES | Facility: HOSPITAL | Age: 88
LOS: 1 days | End: 2023-03-01
Payer: MEDICARE

## 2023-03-01 ENCOUNTER — APPOINTMENT (OUTPATIENT)
Dept: UROGYNECOLOGY | Facility: CLINIC | Age: 88
End: 2023-03-01
Payer: MEDICARE

## 2023-03-01 DIAGNOSIS — Z01.818 ENCOUNTER FOR OTHER PREPROCEDURAL EXAMINATION: ICD-10-CM

## 2023-03-01 PROCEDURE — 52000 CYSTOURETHROSCOPY: CPT

## 2023-03-06 ENCOUNTER — RESULT CHARGE (OUTPATIENT)
Age: 88
End: 2023-03-06

## 2023-03-06 ENCOUNTER — OUTPATIENT (OUTPATIENT)
Dept: OUTPATIENT SERVICES | Facility: HOSPITAL | Age: 88
LOS: 1 days | End: 2023-03-06
Payer: MEDICARE

## 2023-03-06 ENCOUNTER — APPOINTMENT (OUTPATIENT)
Dept: UROGYNECOLOGY | Facility: CLINIC | Age: 88
End: 2023-03-06
Payer: MEDICARE

## 2023-03-06 VITALS — RESPIRATION RATE: 16 BRPM | SYSTOLIC BLOOD PRESSURE: 160 MMHG | DIASTOLIC BLOOD PRESSURE: 60 MMHG | HEART RATE: 65 BPM

## 2023-03-06 DIAGNOSIS — N36.42 INTRINSIC SPHINCTER DEFICIENCY (ISD): ICD-10-CM

## 2023-03-06 DIAGNOSIS — Z01.818 ENCOUNTER FOR OTHER PREPROCEDURAL EXAMINATION: ICD-10-CM

## 2023-03-06 LAB
BILIRUB UR QL STRIP: NORMAL
CLARITY UR: NORMAL
COLLECTION METHOD: NORMAL
GLUCOSE UR-MCNC: NORMAL
HCG UR QL: 0.2 EU/DL
HGB UR QL STRIP.AUTO: NORMAL
KETONES UR-MCNC: NORMAL
LEUKOCYTE ESTERASE UR QL STRIP: NORMAL
NITRITE UR QL STRIP: NORMAL
PH UR STRIP: 5
PROT UR STRIP-MCNC: 30
SP GR UR STRIP: 1.02

## 2023-03-06 PROCEDURE — 51784 ANAL/URINARY MUSCLE STUDY: CPT | Mod: 26

## 2023-03-06 PROCEDURE — 51729 CYSTOMETROGRAM W/VP&UP: CPT

## 2023-03-06 PROCEDURE — 51729 CYSTOMETROGRAM W/VP&UP: CPT | Mod: 26

## 2023-03-06 PROCEDURE — 51797 INTRAABDOMINAL PRESSURE TEST: CPT

## 2023-03-06 PROCEDURE — 51797 INTRAABDOMINAL PRESSURE TEST: CPT | Mod: 26

## 2023-03-06 PROCEDURE — 51784 ANAL/URINARY MUSCLE STUDY: CPT

## 2023-03-06 PROCEDURE — 81003 URINALYSIS AUTO W/O SCOPE: CPT | Mod: QW

## 2023-03-07 ENCOUNTER — NON-APPOINTMENT (OUTPATIENT)
Age: 88
End: 2023-03-07

## 2023-03-08 LAB — BACTERIA UR CULT: NORMAL

## 2023-04-19 ENCOUNTER — APPOINTMENT (OUTPATIENT)
Dept: UROGYNECOLOGY | Facility: CLINIC | Age: 88
End: 2023-04-19
Payer: MEDICARE

## 2023-04-19 DIAGNOSIS — N39.3 STRESS INCONTINENCE (FEMALE) (MALE): ICD-10-CM

## 2023-04-19 PROCEDURE — 99213 OFFICE O/P EST LOW 20 MIN: CPT

## 2023-04-19 NOTE — HISTORY OF PRESENT ILLNESS
[Urinary Tract Infection] : no [de-identified] : decreased [de-identified] : self-cath every 5 hours [FreeTextEntry1] : Patient returns today with her daughter for follow-up on her urinary retention and urinary incontinence.  Her chart was reviewed.  She had a cystoscopy in March which was negative.  She had urodynamic testing in March which also revealed detrusor instability as well as stress incontinence.\par Patient has been self-catheterizing every 5 hours and noting significant improvement in leakage, started self-cath in March originally 3 times a day. \par She does report episodes of urinary incontinence and we discussed treatment options for overactive bladder.  We discussed increasing the amount of time she is catheterizing herself to every 3-4 hours discussed medication as well as PTNS which she tried, Botox or SNM\par States that she had tried medications in the past that did not help.\par She would like to increase self-catheterization to 3-4 hours a day and see if she has improvement in her urinary incontinence, and plan for repeat renal ultrasound in 2 months. Patient agreeable, all questions answered.\par

## 2023-04-19 NOTE — PHYSICAL EXAM
[Well developed] : well developed [Good Hygeine] : demonstrates good hygeine [Well Nourished] : ~L well nourished [Oriented x3] : oriented to person, place, and time [No Acute Distress] : in acute distress

## 2023-04-19 NOTE — REASON FOR VISIT
[Family Member] : family member [Questionnaire Received] : Patient questionnaire received [Intake Form Reviewed] : Patient intake form with past medical history, surgical history, family history and social history reviewed today

## 2023-05-04 NOTE — PROCEDURE
Cholesterol shows Good control. Stable, continue present management   Cholesterol: 133, done on 4/28/2023. HDL Cholesterol: 49, done on 4/28/2023. TriGlycerides 141, done on 4/28/2023. LDL Cholesterol: 60, done on 4/28/2023. Cholesterol medications include ATORVASTATIN 10 MG Oral Tab [[id number]]. [Straight Catheterization] : insertion of a straight catheter [Urinary Tract Infection] : a urinary tract infection [Patient] : the patient [Allergies Reviewed] : Allergies reviewed [___ Fr Straight Tip] : a [unfilled] in [place] straight tip catheter [None] : there were no complications with the catheter insertion [Cloudy] : cloudy [Culture] : culture [Urinalysis] : urinalysis [No Complications] : no complications [Tolerated Well] : the patient tolerated the procedure well [Post procedure instructions and information given] : Post procedure instructions and information were given and reviewed with patient. [FreeTextEntry1] : Urine obtained via straight. Unable to obtain urine via clean catch due to vaginal atrophy.

## 2023-06-12 ENCOUNTER — RX RENEWAL (OUTPATIENT)
Age: 88
End: 2023-06-12

## 2023-06-12 ENCOUNTER — APPOINTMENT (OUTPATIENT)
Dept: ULTRASOUND IMAGING | Facility: CLINIC | Age: 88
End: 2023-06-12

## 2023-06-13 ENCOUNTER — NON-APPOINTMENT (OUTPATIENT)
Age: 88
End: 2023-06-13

## 2023-06-21 ENCOUNTER — OUTPATIENT (OUTPATIENT)
Dept: OUTPATIENT SERVICES | Facility: HOSPITAL | Age: 88
LOS: 1 days | End: 2023-06-21
Payer: MEDICARE

## 2023-06-21 ENCOUNTER — APPOINTMENT (OUTPATIENT)
Dept: ULTRASOUND IMAGING | Facility: CLINIC | Age: 88
End: 2023-06-21
Payer: MEDICARE

## 2023-06-21 DIAGNOSIS — N13.30 UNSPECIFIED HYDRONEPHROSIS: ICD-10-CM

## 2023-06-21 PROCEDURE — 76775 US EXAM ABDO BACK WALL LIM: CPT

## 2023-06-21 PROCEDURE — 76775 US EXAM ABDO BACK WALL LIM: CPT | Mod: 26

## 2023-06-27 ENCOUNTER — APPOINTMENT (OUTPATIENT)
Dept: UROGYNECOLOGY | Facility: CLINIC | Age: 88
End: 2023-06-27
Payer: MEDICARE

## 2023-06-27 VITALS
BODY MASS INDEX: 21.34 KG/M2 | SYSTOLIC BLOOD PRESSURE: 156 MMHG | DIASTOLIC BLOOD PRESSURE: 66 MMHG | HEIGHT: 64 IN | WEIGHT: 125 LBS

## 2023-06-27 DIAGNOSIS — N39.42 INCONTINENCE W/OUT SENSORY AWARENESS: ICD-10-CM

## 2023-06-27 PROCEDURE — 99214 OFFICE O/P EST MOD 30 MIN: CPT

## 2023-06-28 LAB
BUN SERPL-MCNC: 20 MG/DL
CREAT SERPL-MCNC: 0.97 MG/DL
EGFR: 56 ML/MIN/1.73M2

## 2023-06-28 NOTE — DISCUSSION/SUMMARY
[FreeTextEntry1] : Incomplete emptying of bladder/CIC:\par -Pt will continue to perform CIC.  She will continue to empty her bladder by voiding.\par -Provided results of her Renal US done on 6/21/2023 - mild bilateral hydronephrosis.\par -BUN/Crea ordered.\par -PT has an appt with Dr. Wetzel this Friday, 6/30/2023 and she will go to the appt.\par \par Pt will follow up in office 6 months or sooner.  IF pt have any problems/concern to call office.  PT verbalizes understanding and agrees.

## 2023-06-28 NOTE — HISTORY OF PRESENT ILLNESS
[FreeTextEntry1] : Elisabeth, 89y/o presents to the office for follow up on hx of incomplete emptying of bladder requiring CIC.  PT is managing with CIC and she does this every 4 hours or as needed.  She does urinate on her own and gets some urine out.  PT has refills and updated order for additional catheters.

## 2023-06-30 ENCOUNTER — APPOINTMENT (OUTPATIENT)
Dept: UROLOGY | Facility: CLINIC | Age: 88
End: 2023-06-30
Payer: MEDICARE

## 2023-06-30 VITALS
RESPIRATION RATE: 16 BRPM | BODY MASS INDEX: 21.34 KG/M2 | OXYGEN SATURATION: 96 % | HEIGHT: 64 IN | SYSTOLIC BLOOD PRESSURE: 157 MMHG | DIASTOLIC BLOOD PRESSURE: 71 MMHG | TEMPERATURE: 97.6 F | WEIGHT: 125 LBS | HEART RATE: 75 BPM

## 2023-06-30 DIAGNOSIS — N39.0 URINARY TRACT INFECTION, SITE NOT SPECIFIED: ICD-10-CM

## 2023-06-30 PROCEDURE — 99204 OFFICE O/P NEW MOD 45 MIN: CPT

## 2023-06-30 RX ORDER — ESTRADIOL 10 UG/1
10 TABLET, FILM COATED VAGINAL
Qty: 24 | Refills: 2 | Status: DISCONTINUED | COMMUNITY
Start: 2022-09-29 | End: 2023-06-30

## 2023-06-30 RX ORDER — CLONIDINE 0.1 MG/24H
0.1 PATCH, EXTENDED RELEASE TRANSDERMAL
Refills: 0 | Status: DISCONTINUED | COMMUNITY
End: 2023-06-30

## 2023-06-30 RX ORDER — ASPIRIN 81 MG
81 TABLET, DELAYED RELEASE (ENTERIC COATED) ORAL
Refills: 0 | Status: COMPLETED | COMMUNITY
End: 2023-06-30

## 2023-06-30 RX ORDER — SULFAMETHOXAZOLE AND TRIMETHOPRIM 800; 160 MG/1; MG/1
800-160 TABLET ORAL TWICE DAILY
Qty: 6 | Refills: 0 | Status: DISCONTINUED | COMMUNITY
Start: 2023-01-22 | End: 2023-06-30

## 2023-06-30 RX ORDER — ESTRADIOL 0.1 MG/G
0.1 CREAM VAGINAL
Qty: 1 | Refills: 5 | Status: DISCONTINUED | COMMUNITY
Start: 2022-08-29 | End: 2023-06-30

## 2023-06-30 RX ORDER — LEVOFLOXACIN 250 MG/1
250 TABLET, FILM COATED ORAL DAILY
Qty: 3 | Refills: 0 | Status: DISCONTINUED | COMMUNITY
Start: 2022-08-27 | End: 2023-06-30

## 2023-06-30 RX ORDER — CIPROFLOXACIN HYDROCHLORIDE 250 MG/1
250 TABLET, FILM COATED ORAL
Qty: 6 | Refills: 0 | Status: DISCONTINUED | COMMUNITY
Start: 2022-09-29 | End: 2023-06-30

## 2023-06-30 RX ORDER — NITROFURANTOIN (MONOHYDRATE/MACROCRYSTALS) 25; 75 MG/1; MG/1
100 CAPSULE ORAL TWICE DAILY
Qty: 10 | Refills: 0 | Status: DISCONTINUED | COMMUNITY
Start: 2023-01-26 | End: 2023-06-30

## 2023-06-30 RX ORDER — MIRABEGRON 50 MG/1
50 TABLET, FILM COATED, EXTENDED RELEASE ORAL
Qty: 90 | Refills: 3 | Status: ACTIVE | COMMUNITY
Start: 2023-06-30 | End: 1900-01-01

## 2023-06-30 NOTE — PHYSICAL EXAM
[General Appearance - Well Developed] : well developed [General Appearance - Well Nourished] : well nourished [Normal Appearance] : normal appearance [Well Groomed] : well groomed [General Appearance - In No Acute Distress] : no acute distress [Respiration, Rhythm And Depth] : normal respiratory rhythm and effort [Exaggerated Use Of Accessory Muscles For Inspiration] : no accessory muscle use [Normal Station and Gait] : the gait and station were normal for the patient's age [] : no rash [No Focal Deficits] : no focal deficits [Oriented To Time, Place, And Person] : oriented to person, place, and time [Affect] : the affect was normal [Mood] : the mood was normal [Not Anxious] : not anxious [Abdomen Soft] : soft [Abdomen Tenderness] : non-tender

## 2023-07-09 PROBLEM — N39.0 UTI (URINARY TRACT INFECTION): Status: ACTIVE | Noted: 2021-06-24

## 2023-07-09 NOTE — ASSESSMENT
[FreeTextEntry1] : 88 yo female with incomplete bladder emptying of unclear etiology. Now with B mild hydro on renal US imaging. Need to ensure appropriate cathing to prevent high bladder pressures/volume. \par \par --Stop tamsulosin\par --Add myrbetriq\par --Cont cathing 4x or more. \par --Cont methenamine\par --Void diary\par --Refer to Dr Enriquez\par --avoid tx for UTI in absence of systemic sx as pt colonized due to catheterization.

## 2023-07-09 NOTE — HISTORY OF PRESENT ILLNESS
[FreeTextEntry1] : 89 yo F here for incomplete bladder emptying. Chart review shows history of OAB, UI, and UI without sensory\par \par Sees UroGYN Dr. Rodriguez and was following for incomplete bladder emptying for years\par Per patient, was recommended for years in the past to self-catheterize. She did not want to for years but started over the past 4-6 weeks\par Catheterizes 4x daily\par Also voids between catheterizing\par Notes urine leakage as well. Wears Depends and pads. Cannot quantify how many pads she uses in a day\par Was having frequent UTIs in the past, but since catheterizing, she states she has not had one\par Takes tamsulosin 0.4 mg daily\par Denies burning on urination, gross hematuria, urinary frequency\par \par UDS (3/2023) - minimal bladder contractility. By my review, appears to have loss of compliance during bladder filling\par Cysto (3/2023) - chronic inflammation\par Renal U/S done earlier this month shows mild bilateral hydronephrosis\par \par  cc today\par \par Had 4 babies all vaginal deliveries with first needing episiotomy and 10+lb baby. Denies feeling of straining. No abd surgery. Had fall in 2007 with back surgery following this. No hx of radiation.

## 2023-07-13 ENCOUNTER — APPOINTMENT (OUTPATIENT)
Dept: ENDOCRINOLOGY | Facility: CLINIC | Age: 88
End: 2023-07-13
Payer: MEDICARE

## 2023-07-13 VITALS
HEART RATE: 54 BPM | OXYGEN SATURATION: 98 % | WEIGHT: 125 LBS | HEIGHT: 64 IN | DIASTOLIC BLOOD PRESSURE: 54 MMHG | BODY MASS INDEX: 21.34 KG/M2 | SYSTOLIC BLOOD PRESSURE: 120 MMHG

## 2023-07-13 DIAGNOSIS — E03.9 HYPOTHYROIDISM, UNSPECIFIED: ICD-10-CM

## 2023-07-13 DIAGNOSIS — M40.209 UNSPECIFIED KYPHOSIS, SITE UNSPECIFIED: ICD-10-CM

## 2023-07-13 PROCEDURE — 77080 DXA BONE DENSITY AXIAL: CPT | Mod: GA

## 2023-07-13 PROCEDURE — ZZZZZ: CPT

## 2023-07-13 PROCEDURE — 99214 OFFICE O/P EST MOD 30 MIN: CPT | Mod: 25

## 2023-07-13 PROCEDURE — 96372 THER/PROPH/DIAG INJ SC/IM: CPT

## 2023-07-13 RX ORDER — DENOSUMAB 60 MG/ML
60 INJECTION SUBCUTANEOUS
Qty: 1 | Refills: 0 | Status: COMPLETED | OUTPATIENT
Start: 2023-07-13

## 2023-07-13 RX ADMIN — DENOSUMAB 60 MG/ML: 60 INJECTION SUBCUTANEOUS at 00:00

## 2023-07-14 NOTE — PHYSICAL EXAM
[Alert] : alert [Well Nourished] : well nourished [No Acute Distress] : no acute distress [Well Developed] : well developed [Normal Sclera/Conjunctiva] : normal sclera/conjunctiva [EOMI] : extra ocular movement intact [No Proptosis] : no proptosis [Normal Oropharynx] : the oropharynx was normal [Thyroid Not Enlarged] : the thyroid was not enlarged [No Thyroid Nodules] : no palpable thyroid nodules [No Respiratory Distress] : no respiratory distress [Clear to Auscultation] : lungs were clear to auscultation bilaterally [Normal S1, S2] : normal S1 and S2 [Normal Rate] : heart rate was normal [Regular Rhythm] : with a regular rhythm [No Edema] : no peripheral edema [Normal Bowel Sounds] : normal bowel sounds [Not Tender] : non-tender [Not Distended] : not distended [Soft] : abdomen soft [Normal Anterior Cervical Nodes] : no anterior cervical lymphadenopathy [No Spinal Tenderness] : no spinal tenderness [Spine Straight] : spine straight [Kyphosis] : kyphosis present [No Stigmata of Cushings Syndrome] : no stigmata of Cushings Syndrome [Normal Gait] : normal gait [No Rash] : no rash [Normal Reflexes] : deep tendon reflexes were 2+ and symmetric [No Tremors] : no tremors [Oriented x3] : oriented to person, place, and time [Acanthosis Nigricans] : no acanthosis nigricans [de-identified] : left upper canine site clean  [de-identified] : Unsteady gait

## 2023-07-14 NOTE — HISTORY OF PRESENT ILLNESS
[Alendronate (Fosomax)] : Alendronate [Estrogens (HRT)] : HRT [Teriparatide (Forteo)] : Teriparatide [Prolia (Denosumab)] : Prolia (Denosumab) [Regular Dental Follow-Up] : regular dental follow-up [Previous Fragility Fracture] : previous fragility fracture(s) [FreeTextEntry1] : Patient returns for a follow up visit for osteoporosis. Pt reports having several falls. No fractures. \par \par Prev on HRT then Fosamax then Forteo for 2 years. On Prolia since Nov 2011. Tolerating well. No thigh pain, no interval fx. Last DDS within past 6 months. No ONJ. Implant 3/2021, no complications. BMD 2015 stable. BMD 9/2017 stable, sl decrease fem neck. BMD 2018 improved in proximal radius. BMD 12/2020 c/w improving osteopenia in hip, stable osteoporosis in proximal radius. \par The patient had a dental implant many months ago awaiting final placement of crown.\par Thyroid: on Levothyroxine 100 mcg daily. No local neck pain. No dysphagia or dysphonia. No raciness, shakiness, heat/cold intolerance, tiredness, or fatigue. No palpitations, tremors, or sudden weight gain/loss. \par \par Sees urology for bladder dysfunction. [Disordered Eating] : no past or present history of disordered eating [Taking Steroids] : no past or present history of taking steroids [Kidney Stones] : no history of kidney stones [Hyperparathyroidism] : no hyperparathyroidism [de-identified] : prior comp fx

## 2023-07-14 NOTE — ASSESSMENT
[Denosumab Therapy] : Risks  and benefits of denosumab therapy were discussed with the patient including eczema, cellulitis, osteonecrosis of the jaw and atypical femur fractures [Levothyroxine] : The patient was instructed to take Levothyroxine on an empty stomach, separate from vitamins, and wait at least 30 minutes before eating [FreeTextEntry1] : 89 y/o female with osteoporosis and hypothyroidism. \par \par 1. Osteoporosis: Previously treated with Fosamax, HRT, and Forteo (2 years). On Prolia since 2011. Tolerating well. No thigh pain, no interval fx. No ONJ. \par Bone mineral density 2023 showed continued improvement especially femoral neck.  Overall bone density significantly improved versus 2011.\par . Continue Prolia, buy and bill. \par \par 2. Hypothyroidism: clinically and chemically euthyroid on LT4 88 mcg daily   No local neck pain. No dysphagia or dysphonia. No raciness, shakiness, heat/cold intolerance, tiredness, or fatigue. No palpitations, tremors, or sudden weight gain/loss.n. \par \par 3. Kyphosis: stable on exam.\par  \par F/u in 6 months  \par \par \par

## 2023-07-14 NOTE — PROCEDURE
[FreeTextEntry1] : Bone mineral density: 07/13/2023\par indication: Compared to 2020\par spine not performed\par total hip -2.0 osteopenia no significant change,'s +7.8% versus baseline 2011\par femoral neck -1.7 osteopenia +7.3%, +25.2% versus 2011\par proximal radius -2.6 osteoporosis no significant change, +8.8% versus 2011\par \par Exam Date: December 2, 2020\par Indication: Compared to 2018\par Lumbar spine 1-4  BMD: Not performed \par Total hip: -2.0  WHO Classification osteopenia +4.6%\par Femoral neck: -2.1  WHO Classification osteopenia +8.7%   \par Proximal wrist: -2.7  WHO Classification osteoporosis no significant change  \par \par Bone mineral density: 09/12/2018 \par Indication: vs.  2017 prior test showed bone loss\par Spine: not done\par Total hip: -2.3 osteopenia, no significant change \par Femoral neck: -2.5 osteoporosis, no significant change \par Proximal radius: -2.7 osteoporosis (+4.6%)\par \par bone mineral density September 2017\par Lumbar spine 1-4 Not performed\par Total hip: -2.3 osteopenia, no significant change \par Femoral neck: -2.7  osteoporosis, -4.1% not significant   \par Proximal wrist: -3.1 osteoporosis, no significant change  \par \par bone mineral density 7/1/15\par spine not performed to scoliosis\par Total hip -2.2, osteopenia, no significant change versus 2013\par Femoral neck -2.5, osteoporosis, no significant change versus 2013\par Proximal radius -3.1, osteoporosis, no significant change

## 2023-08-21 ENCOUNTER — APPOINTMENT (OUTPATIENT)
Dept: UROLOGY | Facility: CLINIC | Age: 88
End: 2023-08-21
Payer: MEDICARE

## 2023-08-21 ENCOUNTER — LABORATORY RESULT (OUTPATIENT)
Age: 88
End: 2023-08-21

## 2023-08-21 VITALS
OXYGEN SATURATION: 98 % | HEART RATE: 64 BPM | RESPIRATION RATE: 16 BRPM | TEMPERATURE: 98.3 F | DIASTOLIC BLOOD PRESSURE: 64 MMHG | SYSTOLIC BLOOD PRESSURE: 151 MMHG

## 2023-08-21 DIAGNOSIS — N95.8 OTHER SPECIFIED MENOPAUSAL AND PERIMENOPAUSAL DISORDERS: ICD-10-CM

## 2023-08-21 DIAGNOSIS — N39.0 URINARY TRACT INFECTION, SITE NOT SPECIFIED: ICD-10-CM

## 2023-08-21 PROCEDURE — 99215 OFFICE O/P EST HI 40 MIN: CPT

## 2023-08-21 RX ORDER — TAMSULOSIN HYDROCHLORIDE 0.4 MG/1
0.4 CAPSULE ORAL DAILY
Qty: 60 | Refills: 1 | Status: COMPLETED | COMMUNITY
Start: 2021-06-28 | End: 2023-08-21

## 2023-08-21 RX ORDER — ESTRADIOL 10 UG/1
10 TABLET, FILM COATED VAGINAL
Qty: 10 | Refills: 5 | Status: ACTIVE | COMMUNITY
Start: 2022-08-24 | End: 1900-01-01

## 2023-08-21 NOTE — PHYSICAL EXAM
[General Appearance - In No Acute Distress] : no acute distress [Skin Color & Pigmentation] : normal skin color and pigmentation [] : no respiratory distress [Affect] : the affect was normal [Mood] : the mood was normal

## 2023-08-21 NOTE — ASSESSMENT
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 Prescribed Vaginal Estradiol cream to be used 3x / week - Mon / Wed / Friday Instructed patient on appropriate use - with an entire applicator full into the vagina and then to distribute the cream throughout the vagina with a focus on the roof of the vagina near the urethral meatus Counseled patient on the findings of recent studies and guidelines suggesting the safety of vaginal estrogen cream  ellura   repeat renal u/s in sept 2023  if hydro improved on myrbetriq 50, will cont with annual u/s  if same or worse, we will likely conisder bladder botox  Patient understands that the intervention lasts between 6-9 months before needing a repeat injection. Other risks including UTI and hematuria discussed.

## 2023-08-21 NOTE — HISTORY OF PRESENT ILLNESS
[FreeTextEntry1] : 89 yr old, , female patient with hx of HTN, HLD, and Hypothyroidism presents to office today for further evaluation of urinary symptoms, currently on Myrbetriq 50mg po daily. Referred by Dr. Wetzel. Accompanied today by daughter, Dianne.   pt started CIC 4-5x daily few months ago due to incomplete bladder emptying, although it was recommended to her many years ago.  Uses coloplast 14f catheters  unaware of the urinary leakage sometimes, pt void before she cath and sometimes she does not  wears pads daily - unable to quantify   + severe constipation - not seeing a GI, pt do not want to either  Denies burning on urination and gross hematuria  UDS (3/2023) - minimal bladder contractility. By my review, appears to have loss of compliance during bladder filling  Cysto (3/2023) - chronic inflammation  Renal U/S 2023 - mild bilateral hydronephrosis she was placed on myrbetriq 50 - 9 days later   PVR 2023 -  195 cc

## 2023-08-22 LAB
APPEARANCE: CLEAR
BILIRUBIN URINE: NEGATIVE
BLOOD URINE: NEGATIVE
COLOR: YELLOW
GLUCOSE QUALITATIVE U: NEGATIVE MG/DL
KETONES URINE: NEGATIVE MG/DL
LEUKOCYTE ESTERASE URINE: ABNORMAL
NITRITE URINE: POSITIVE
PH URINE: 6
PROTEIN URINE: NEGATIVE MG/DL
SPECIFIC GRAVITY URINE: 1.01
UROBILINOGEN URINE: 0.2 MG/DL

## 2023-08-28 ENCOUNTER — NON-APPOINTMENT (OUTPATIENT)
Age: 88
End: 2023-08-28

## 2023-10-02 ENCOUNTER — APPOINTMENT (OUTPATIENT)
Dept: ULTRASOUND IMAGING | Facility: CLINIC | Age: 88
End: 2023-10-02
Payer: MEDICARE

## 2023-10-02 PROCEDURE — 76770 US EXAM ABDO BACK WALL COMP: CPT

## 2023-12-11 ENCOUNTER — APPOINTMENT (OUTPATIENT)
Dept: UROLOGY | Facility: CLINIC | Age: 88
End: 2023-12-11
Payer: MEDICARE

## 2023-12-11 ENCOUNTER — OUTPATIENT (OUTPATIENT)
Dept: OUTPATIENT SERVICES | Facility: HOSPITAL | Age: 88
LOS: 1 days | End: 2023-12-11
Payer: MEDICARE

## 2023-12-11 VITALS — HEART RATE: 59 BPM | SYSTOLIC BLOOD PRESSURE: 155 MMHG | DIASTOLIC BLOOD PRESSURE: 67 MMHG

## 2023-12-11 DIAGNOSIS — N32.81 OVERACTIVE BLADDER: ICD-10-CM

## 2023-12-11 DIAGNOSIS — R35.0 FREQUENCY OF MICTURITION: ICD-10-CM

## 2023-12-11 PROCEDURE — 52287 CYSTOSCOPY CHEMODENERVATION: CPT

## 2023-12-12 DIAGNOSIS — N32.81 OVERACTIVE BLADDER: ICD-10-CM

## 2023-12-12 DIAGNOSIS — Z78.9 OTHER SPECIFIED HEALTH STATUS: ICD-10-CM

## 2023-12-12 DIAGNOSIS — N31.8 OTHER NEUROMUSCULAR DYSFUNCTION OF BLADDER: ICD-10-CM

## 2023-12-12 DIAGNOSIS — N39.41 URGE INCONTINENCE: ICD-10-CM

## 2023-12-12 RX ORDER — ONABOTULINUMTOXINA 100 [USP'U]/1
100 INJECTION, POWDER, LYOPHILIZED, FOR SOLUTION INTRADERMAL; INTRAMUSCULAR
Qty: 1 | Refills: 0 | Status: COMPLETED | OUTPATIENT
Start: 2023-12-11 | End: 2023-12-11

## 2023-12-12 RX ORDER — ONABOTULINUMTOXINA 100 [USP'U]/1
100 INJECTION, POWDER, LYOPHILIZED, FOR SOLUTION INTRADERMAL; INTRAMUSCULAR
Qty: 1 | Refills: 0 | Status: COMPLETED | OUTPATIENT
Start: 2023-12-12

## 2023-12-12 RX ADMIN — ONABOTULINUMTOXINA 0 UNIT: 100 INJECTION, POWDER, LYOPHILIZED, FOR SOLUTION INTRADERMAL; INTRAMUSCULAR at 00:00

## 2024-01-16 ENCOUNTER — APPOINTMENT (OUTPATIENT)
Dept: ENDOCRINOLOGY | Facility: CLINIC | Age: 89
End: 2024-01-16

## 2024-01-29 ENCOUNTER — APPOINTMENT (OUTPATIENT)
Dept: ENDOCRINOLOGY | Facility: CLINIC | Age: 89
End: 2024-01-29
Payer: MEDICARE

## 2024-01-29 DIAGNOSIS — M81.0 AGE-RELATED OSTEOPOROSIS W/OUT CURRENT PATHOLOGICAL FRACTURE: ICD-10-CM

## 2024-01-29 PROCEDURE — 96372 THER/PROPH/DIAG INJ SC/IM: CPT

## 2024-01-29 RX ORDER — DENOSUMAB 60 MG/ML
60 INJECTION SUBCUTANEOUS
Qty: 1 | Refills: 0 | Status: COMPLETED | OUTPATIENT
Start: 2024-01-25

## 2024-05-13 ENCOUNTER — NON-APPOINTMENT (OUTPATIENT)
Age: 89
End: 2024-05-13

## 2024-05-13 RX ORDER — CIPROFLOXACIN HYDROCHLORIDE 500 MG/1
500 TABLET, FILM COATED ORAL DAILY
Qty: 3 | Refills: 0 | Status: ACTIVE | COMMUNITY
Start: 2024-05-13 | End: 1900-01-01

## 2024-06-13 ENCOUNTER — APPOINTMENT (OUTPATIENT)
Dept: UROLOGY | Facility: CLINIC | Age: 89
End: 2024-06-13
Payer: MEDICARE

## 2024-06-13 ENCOUNTER — OUTPATIENT (OUTPATIENT)
Dept: OUTPATIENT SERVICES | Facility: HOSPITAL | Age: 89
LOS: 1 days | End: 2024-06-13

## 2024-06-13 VITALS
HEART RATE: 66 BPM | RESPIRATION RATE: 16 BRPM | SYSTOLIC BLOOD PRESSURE: 146 MMHG | DIASTOLIC BLOOD PRESSURE: 61 MMHG | TEMPERATURE: 97.2 F

## 2024-06-13 DIAGNOSIS — R33.9 RETENTION OF URINE, UNSPECIFIED: ICD-10-CM

## 2024-06-13 DIAGNOSIS — Z78.9 OTHER SPECIFIED HEALTH STATUS: ICD-10-CM

## 2024-06-13 DIAGNOSIS — R35.0 FREQUENCY OF MICTURITION: ICD-10-CM

## 2024-06-13 DIAGNOSIS — N31.8 OTHER NEUROMUSCULAR DYSFUNCTION OF BLADDER: ICD-10-CM

## 2024-06-13 DIAGNOSIS — N39.41 URGE INCONTINENCE: ICD-10-CM

## 2024-06-13 PROCEDURE — 52287 CYSTOSCOPY CHEMODENERVATION: CPT

## 2024-06-13 RX ORDER — ONABOTULINUMTOXINA 100 [USP'U]/1
100 INJECTION, POWDER, LYOPHILIZED, FOR SOLUTION INTRADERMAL; INTRAMUSCULAR
Qty: 2 | Refills: 0 | Status: COMPLETED | OUTPATIENT
Start: 2024-06-13 | End: 2024-06-13

## 2024-06-13 RX ORDER — ONABOTULINUMTOXINA 100 [USP'U]/1
100 INJECTION, POWDER, LYOPHILIZED, FOR SOLUTION INTRADERMAL; INTRAMUSCULAR
Qty: 1 | Refills: 0 | Status: COMPLETED | OUTPATIENT
Start: 2024-06-13

## 2024-06-13 RX ADMIN — ONABOTULINUMTOXINA 0 UNIT: 100 INJECTION, POWDER, LYOPHILIZED, FOR SOLUTION INTRADERMAL; INTRAMUSCULAR at 00:00

## 2024-07-30 ENCOUNTER — APPOINTMENT (OUTPATIENT)
Dept: ENDOCRINOLOGY | Facility: CLINIC | Age: 89
End: 2024-07-30
Payer: MEDICARE

## 2024-07-30 VITALS
WEIGHT: 128 LBS | SYSTOLIC BLOOD PRESSURE: 102 MMHG | BODY MASS INDEX: 21.85 KG/M2 | OXYGEN SATURATION: 97 % | HEART RATE: 75 BPM | HEIGHT: 64 IN | DIASTOLIC BLOOD PRESSURE: 60 MMHG

## 2024-07-30 DIAGNOSIS — M40.209 UNSPECIFIED KYPHOSIS, SITE UNSPECIFIED: ICD-10-CM

## 2024-07-30 DIAGNOSIS — E03.9 HYPOTHYROIDISM, UNSPECIFIED: ICD-10-CM

## 2024-07-30 DIAGNOSIS — M81.0 AGE-RELATED OSTEOPOROSIS W/OUT CURRENT PATHOLOGICAL FRACTURE: ICD-10-CM

## 2024-07-30 PROCEDURE — 96372 THER/PROPH/DIAG INJ SC/IM: CPT

## 2024-07-30 PROCEDURE — 99214 OFFICE O/P EST MOD 30 MIN: CPT | Mod: 25

## 2024-07-30 RX ORDER — DENOSUMAB 60 MG/ML
60 INJECTION SUBCUTANEOUS
Qty: 1 | Refills: 0 | Status: COMPLETED | OUTPATIENT
Start: 2024-07-30

## 2024-07-30 RX ADMIN — DENOSUMAB 60 MG/ML: 60 INJECTION SUBCUTANEOUS at 00:00

## 2024-07-30 NOTE — HISTORY OF PRESENT ILLNESS
[Alendronate (Fosomax)] : Alendronate [Estrogens (HRT)] : HRT [Teriparatide (Forteo)] : Teriparatide [Prolia (Denosumab)] : Prolia (Denosumab) [Regular Dental Follow-Up] : regular dental follow-up [Previous Fragility Fracture] : previous fragility fracture(s) [FreeTextEntry1] :  Pt returns for a follow-up visit for osteoporosis. On Prolia since Nov 2011. Pt gets botox injections for bladder dysfunction. No major surgeries, hospitalizations, fractures or changes in medication. Up to date with dentist. No major dental work planned.  Prev on HRT then Fosamax then Forteo for 2 years. On Prolia since Nov 2011. Tolerating well. No thigh pain, no interval fx. Last DDS within past 6 months. No ONJ. Implant 3/2021, no complications. BMD 2015 stable. BMD 9/2017 stable, sl decrease fem neck. BMD 2018 improved in proximal radius. BMD 12/2020 c/w improving osteopenia in hip, stable osteoporosis in proximal radius.  BMD results reviewed w/ pt. Bone mineral density 07/2023 showed continued improvement especially femoral neck.  Overall bone density significantly improved versus 2011. BMD results reviewed w/ pt.  Thyroid: on Levothyroxine 88 mcg daily. No local neck pain. No dysphagia or dysphonia. No raciness, shakiness, heat/cold intolerance, tiredness, or fatigue. No palpitations, tremors, or sudden weight gain/loss.   Sees urology for bladder dysfunction. [Disordered Eating] : no past or present history of disordered eating [Taking Steroids] : no past or present history of taking steroids [Kidney Stones] : no history of kidney stones [Hyperparathyroidism] : no hyperparathyroidism [de-identified] : prior comp fx

## 2024-07-30 NOTE — ASSESSMENT
[Denosumab Therapy] : Risks  and benefits of denosumab therapy were discussed with the patient including eczema, cellulitis, osteonecrosis of the jaw and atypical femur fractures [Levothyroxine] : The patient was instructed to take Levothyroxine on an empty stomach, separate from vitamins, and wait at least 30 minutes before eating [FreeTextEntry1] : 88 y/o female returns for a follow-up visit for osteoporosis and hypothyroidism.   1. Osteoporosis: Prev on HRT then Fosamax then Forteo for 2 years. On Prolia since Nov 2011. Tolerating well. No thigh pain, no interval fx. Last DDS within past 6 months. No ONJ. Implant 3/2021, no complications. BMD 2015 stable. BMD 9/2017 stable, sl decrease fem neck. BMD 2018 improved in proximal radius. BMD 12/2020 c/w improving osteopenia in hip, stable osteoporosis in proximal radius. BMD results reviewed w/ pt. Bone mineral density 07/2023 showed continued improvement especially femoral neck.  Overall bone density significantly improved versus 2011. BMD results reviewed w/ pt. Continue Prolia, buy and bill.   2. Hypothyroidism: clinically and chemically euthyroid on LT4 88 mcg daily. No local neck pain. No dysphagia or dysphonia. No raciness, shakiness, heat/cold intolerance, tiredness, or fatigue. No palpitations, tremors, or sudden weight gain/loss.  3. Kyphosis: stable on physical examination.  Labs: July 2024 Creatinine: 0.95 Calcium: 9.5  F/u in 6 months

## 2024-07-30 NOTE — PHYSICAL EXAM
[Alert] : alert [Well Nourished] : well nourished [No Acute Distress] : no acute distress [Well Developed] : well developed [Normal Sclera/Conjunctiva] : normal sclera/conjunctiva [EOMI] : extra ocular movement intact [No Proptosis] : no proptosis [Normal Oropharynx] : the oropharynx was normal [Thyroid Not Enlarged] : the thyroid was not enlarged [No Thyroid Nodules] : no palpable thyroid nodules [No Respiratory Distress] : no respiratory distress [Clear to Auscultation] : lungs were clear to auscultation bilaterally [Normal S1, S2] : normal S1 and S2 [Normal Rate] : heart rate was normal [Regular Rhythm] : with a regular rhythm [No Edema] : no peripheral edema [Normal Bowel Sounds] : normal bowel sounds [Not Tender] : non-tender [Not Distended] : not distended [Soft] : abdomen soft [Normal Anterior Cervical Nodes] : no anterior cervical lymphadenopathy [No Spinal Tenderness] : no spinal tenderness [Spine Straight] : spine straight [Kyphosis] : kyphosis present [No Stigmata of Cushings Syndrome] : no stigmata of Cushings Syndrome [Normal Gait] : normal gait [No Rash] : no rash [Normal Reflexes] : deep tendon reflexes were 2+ and symmetric [No Tremors] : no tremors [Oriented x3] : oriented to person, place, and time [Acanthosis Nigricans] : no acanthosis nigricans [de-identified] : left upper canine site clean  [de-identified] : Unsteady gait

## 2024-07-30 NOTE — HISTORY OF PRESENT ILLNESS
[Alendronate (Fosomax)] : Alendronate [Estrogens (HRT)] : HRT [Teriparatide (Forteo)] : Teriparatide [Prolia (Denosumab)] : Prolia (Denosumab) [Regular Dental Follow-Up] : regular dental follow-up [Previous Fragility Fracture] : previous fragility fracture(s) [FreeTextEntry1] :  Pt returns for a follow-up visit for osteoporosis. On Prolia since Nov 2011. Pt gets botox injections for bladder dysfunction. No major surgeries, hospitalizations, fractures or changes in medication. Up to date with dentist. No major dental work planned.  Prev on HRT then Fosamax then Forteo for 2 years. On Prolia since Nov 2011. Tolerating well. No thigh pain, no interval fx. Last DDS within past 6 months. No ONJ. Implant 3/2021, no complications. BMD 2015 stable. BMD 9/2017 stable, sl decrease fem neck. BMD 2018 improved in proximal radius. BMD 12/2020 c/w improving osteopenia in hip, stable osteoporosis in proximal radius.  BMD results reviewed w/ pt. Bone mineral density 07/2023 showed continued improvement especially femoral neck.  Overall bone density significantly improved versus 2011. BMD results reviewed w/ pt.  Thyroid: on Levothyroxine 88 mcg daily. No local neck pain. No dysphagia or dysphonia. No raciness, shakiness, heat/cold intolerance, tiredness, or fatigue. No palpitations, tremors, or sudden weight gain/loss.   Sees urology for bladder dysfunction. [Disordered Eating] : no past or present history of disordered eating [Taking Steroids] : no past or present history of taking steroids [Kidney Stones] : no history of kidney stones [Hyperparathyroidism] : no hyperparathyroidism [de-identified] : prior comp fx

## 2024-07-30 NOTE — PHYSICAL EXAM
[Alert] : alert [Well Nourished] : well nourished [No Acute Distress] : no acute distress [Well Developed] : well developed [Normal Sclera/Conjunctiva] : normal sclera/conjunctiva [EOMI] : extra ocular movement intact [No Proptosis] : no proptosis [Normal Oropharynx] : the oropharynx was normal [Thyroid Not Enlarged] : the thyroid was not enlarged [No Thyroid Nodules] : no palpable thyroid nodules [No Respiratory Distress] : no respiratory distress [Clear to Auscultation] : lungs were clear to auscultation bilaterally [Normal S1, S2] : normal S1 and S2 [Normal Rate] : heart rate was normal [Regular Rhythm] : with a regular rhythm [No Edema] : no peripheral edema [Normal Bowel Sounds] : normal bowel sounds [Not Tender] : non-tender [Not Distended] : not distended [Soft] : abdomen soft [Normal Anterior Cervical Nodes] : no anterior cervical lymphadenopathy [No Spinal Tenderness] : no spinal tenderness [Spine Straight] : spine straight [Kyphosis] : kyphosis present [No Stigmata of Cushings Syndrome] : no stigmata of Cushings Syndrome [Normal Gait] : normal gait [No Rash] : no rash [Normal Reflexes] : deep tendon reflexes were 2+ and symmetric [No Tremors] : no tremors [Oriented x3] : oriented to person, place, and time [Acanthosis Nigricans] : no acanthosis nigricans [de-identified] : left upper canine site clean  [de-identified] : Unsteady gait

## 2024-07-30 NOTE — END OF VISIT
[FreeTextEntry3] :  This note was written by Amanda Gutierrez on (July 30, 2024) acting as a medical scribe for Dr. Foster This note was authored by the medical scribe for me. I have reviewed, edited, and revised the note as needed. I am in agreement with the exam findings, imaging findings, and treatment plan.  Nilson Foster MD

## 2024-07-30 NOTE — END OF VISIT
[FreeTextEntry3] :  This note was written by Amanda Gutierrze on (July 30, 2024) acting as a medical scribe for Dr. Foster This note was authored by the medical scribe for me. I have reviewed, edited, and revised the note as needed. I am in agreement with the exam findings, imaging findings, and treatment plan.  Nilson Foster MD

## 2024-08-16 NOTE — DISCUSSION/SUMMARY
[FreeTextEntry1] : 1. Incomplete emptying\par - Discussed elevated PVR with patient, she does not want to have a catheter placed or learn CIC. Educated patient importance of preventing urinary retention and urinary retention can cause kidney damage, patient agrees and verbalized understanding. She is opting to continue with Flomax 8 mg daily\par - Will continue with void techniques to promote complete emptying.\par \par 2. OAB\par - Discussed SNS with patient, educated about how it works and benefits, patient refused at this time\par - Will Continue with diet/behavioral modifications\par \par Will RTO for follow up and PVR check in 3 months or sooner if needed. Patient agrees to call office with any questions or concerns, verbalized understanding.  ED MD

## 2024-12-09 ENCOUNTER — APPOINTMENT (OUTPATIENT)
Dept: UROLOGY | Facility: CLINIC | Age: 88
End: 2024-12-09

## 2024-12-09 ENCOUNTER — APPOINTMENT (OUTPATIENT)
Dept: UROLOGY | Facility: CLINIC | Age: 88
End: 2024-12-09
Payer: MEDICARE

## 2024-12-09 ENCOUNTER — OUTPATIENT (OUTPATIENT)
Dept: OUTPATIENT SERVICES | Facility: HOSPITAL | Age: 88
LOS: 1 days | End: 2024-12-09
Payer: COMMERCIAL

## 2024-12-09 VITALS
SYSTOLIC BLOOD PRESSURE: 117 MMHG | RESPIRATION RATE: 15 BRPM | TEMPERATURE: 97.5 F | DIASTOLIC BLOOD PRESSURE: 72 MMHG | OXYGEN SATURATION: 98 % | HEART RATE: 69 BPM

## 2024-12-09 DIAGNOSIS — Z78.9 OTHER SPECIFIED HEALTH STATUS: ICD-10-CM

## 2024-12-09 DIAGNOSIS — N39.41 URGE INCONTINENCE: ICD-10-CM

## 2024-12-09 DIAGNOSIS — R35.0 FREQUENCY OF MICTURITION: ICD-10-CM

## 2024-12-09 DIAGNOSIS — N39.0 URINARY TRACT INFECTION, SITE NOT SPECIFIED: ICD-10-CM

## 2024-12-09 DIAGNOSIS — N31.8 OTHER NEUROMUSCULAR DYSFUNCTION OF BLADDER: ICD-10-CM

## 2024-12-09 PROCEDURE — 52287 CYSTOSCOPY CHEMODENERVATION: CPT

## 2024-12-09 RX ORDER — ONABOTULINUMTOXINA 100 [USP'U]/1
100 INJECTION, POWDER, LYOPHILIZED, FOR SOLUTION INTRADERMAL; INTRAMUSCULAR
Qty: 1 | Refills: 0 | Status: COMPLETED | OUTPATIENT
Start: 2024-12-09

## 2024-12-09 RX ORDER — ONABOTULINUMTOXINA 100 [USP'U]/1
100 INJECTION, POWDER, LYOPHILIZED, FOR SOLUTION INTRADERMAL; INTRAMUSCULAR
Qty: 1 | Refills: 0 | Status: COMPLETED | OUTPATIENT
Start: 2024-12-09 | End: 2024-12-09

## 2024-12-09 RX ADMIN — ONABOTULINUMTOXINA 0 UNIT: 100 INJECTION, POWDER, LYOPHILIZED, FOR SOLUTION INTRADERMAL; INTRAMUSCULAR at 00:00

## 2024-12-10 DIAGNOSIS — N31.8 OTHER NEUROMUSCULAR DYSFUNCTION OF BLADDER: ICD-10-CM

## 2024-12-10 DIAGNOSIS — Z78.9 OTHER SPECIFIED HEALTH STATUS: ICD-10-CM

## 2024-12-10 DIAGNOSIS — N39.0 URINARY TRACT INFECTION, SITE NOT SPECIFIED: ICD-10-CM

## 2025-01-27 ENCOUNTER — RX RENEWAL (OUTPATIENT)
Age: 89
End: 2025-01-27

## 2025-02-06 ENCOUNTER — APPOINTMENT (OUTPATIENT)
Dept: ENDOCRINOLOGY | Facility: CLINIC | Age: 89
End: 2025-02-06

## 2025-02-06 ENCOUNTER — MED ADMIN CHARGE (OUTPATIENT)
Age: 89
End: 2025-02-06

## 2025-02-06 ENCOUNTER — APPOINTMENT (OUTPATIENT)
Dept: ENDOCRINOLOGY | Facility: CLINIC | Age: 89
End: 2025-02-06
Payer: MEDICARE

## 2025-02-06 PROCEDURE — 96372 THER/PROPH/DIAG INJ SC/IM: CPT

## 2025-02-06 RX ORDER — DENOSUMAB 60 MG/ML
60 INJECTION SUBCUTANEOUS
Qty: 1 | Refills: 0 | Status: COMPLETED | OUTPATIENT
Start: 2025-02-05

## 2025-03-03 ENCOUNTER — NON-APPOINTMENT (OUTPATIENT)
Age: 89
End: 2025-03-03

## 2025-03-17 ENCOUNTER — APPOINTMENT (OUTPATIENT)
Dept: RADIOLOGY | Facility: CLINIC | Age: 89
End: 2025-03-17
Payer: MEDICARE

## 2025-03-17 PROCEDURE — 71046 X-RAY EXAM CHEST 2 VIEWS: CPT

## 2025-06-04 ENCOUNTER — APPOINTMENT (OUTPATIENT)
Dept: ULTRASOUND IMAGING | Facility: CLINIC | Age: 89
End: 2025-06-04
Payer: MEDICARE

## 2025-06-04 ENCOUNTER — APPOINTMENT (OUTPATIENT)
Dept: RADIOLOGY | Facility: CLINIC | Age: 89
End: 2025-06-04
Payer: MEDICARE

## 2025-06-04 PROCEDURE — 76775 US EXAM ABDO BACK WALL LIM: CPT

## 2025-06-04 PROCEDURE — 72100 X-RAY EXAM L-S SPINE 2/3 VWS: CPT

## 2025-06-26 RX ORDER — AMOXICILLIN AND CLAVULANATE POTASSIUM 875; 125 MG/1; MG/1
875-125 TABLET, COATED ORAL
Qty: 10 | Refills: 0 | Status: COMPLETED | COMMUNITY
Start: 2025-06-26 | End: 2025-07-01

## 2025-06-30 ENCOUNTER — OUTPATIENT (OUTPATIENT)
Dept: OUTPATIENT SERVICES | Facility: HOSPITAL | Age: 89
LOS: 1 days | End: 2025-06-30
Payer: COMMERCIAL

## 2025-06-30 ENCOUNTER — APPOINTMENT (OUTPATIENT)
Dept: UROLOGY | Facility: CLINIC | Age: 89
End: 2025-06-30
Payer: MEDICARE

## 2025-06-30 VITALS — DIASTOLIC BLOOD PRESSURE: 71 MMHG | OXYGEN SATURATION: 100 % | SYSTOLIC BLOOD PRESSURE: 169 MMHG | HEART RATE: 71 BPM

## 2025-06-30 DIAGNOSIS — R35.0 FREQUENCY OF MICTURITION: ICD-10-CM

## 2025-06-30 PROCEDURE — 52287 CYSTOSCOPY CHEMODENERVATION: CPT

## 2025-06-30 RX ORDER — ONABOTULINUMTOXINA 100 [USP'U]/1
100 INJECTION, POWDER, LYOPHILIZED, FOR SOLUTION INTRADERMAL; INTRAMUSCULAR
Qty: 1 | Refills: 0 | Status: COMPLETED | OUTPATIENT
Start: 2025-06-30

## 2025-06-30 RX ADMIN — ONABOTULINUMTOXINA 0 UNIT: 100 INJECTION, POWDER, LYOPHILIZED, FOR SOLUTION INTRADERMAL; INTRAMUSCULAR at 00:00

## 2025-07-02 DIAGNOSIS — Z78.9 OTHER SPECIFIED HEALTH STATUS: ICD-10-CM

## 2025-07-02 DIAGNOSIS — N39.41 URGE INCONTINENCE: ICD-10-CM

## 2025-07-02 DIAGNOSIS — N95.8 OTHER SPECIFIED MENOPAUSAL AND PERIMENOPAUSAL DISORDERS: ICD-10-CM

## 2025-07-02 RX ORDER — ONABOTULINUMTOXINA 100 [USP'U]/1
100 INJECTION, POWDER, LYOPHILIZED, FOR SOLUTION INTRADERMAL; INTRAMUSCULAR
Qty: 1 | Refills: 0 | Status: COMPLETED | OUTPATIENT
Start: 2025-06-30 | End: 2025-06-30

## 2025-08-13 ENCOUNTER — NON-APPOINTMENT (OUTPATIENT)
Age: 89
End: 2025-08-13

## 2025-08-15 ENCOUNTER — NON-APPOINTMENT (OUTPATIENT)
Age: 89
End: 2025-08-15

## 2025-08-15 ENCOUNTER — APPOINTMENT (OUTPATIENT)
Dept: ENDOCRINOLOGY | Facility: CLINIC | Age: 89
End: 2025-08-15
Payer: MEDICARE

## 2025-08-15 VITALS
BODY MASS INDEX: 20.98 KG/M2 | WEIGHT: 114 LBS | HEART RATE: 70 BPM | OXYGEN SATURATION: 98 % | SYSTOLIC BLOOD PRESSURE: 130 MMHG | HEIGHT: 62 IN | DIASTOLIC BLOOD PRESSURE: 62 MMHG

## 2025-08-15 DIAGNOSIS — E03.9 HYPOTHYROIDISM, UNSPECIFIED: ICD-10-CM

## 2025-08-15 DIAGNOSIS — M40.209 UNSPECIFIED KYPHOSIS, SITE UNSPECIFIED: ICD-10-CM

## 2025-08-15 DIAGNOSIS — M81.0 AGE-RELATED OSTEOPOROSIS W/OUT CURRENT PATHOLOGICAL FRACTURE: ICD-10-CM

## 2025-08-15 PROCEDURE — 99214 OFFICE O/P EST MOD 30 MIN: CPT | Mod: 25

## 2025-08-15 PROCEDURE — 77080 DXA BONE DENSITY AXIAL: CPT | Mod: GA

## 2025-08-15 PROCEDURE — 96372 THER/PROPH/DIAG INJ SC/IM: CPT

## 2025-08-15 PROCEDURE — ZZZZZ: CPT

## 2025-08-15 RX ORDER — DENOSUMAB 60 MG/ML
60 INJECTION SUBCUTANEOUS
Qty: 1 | Refills: 0 | Status: COMPLETED | OUTPATIENT
Start: 2025-08-15

## 2025-08-15 RX ADMIN — DENOSUMAB 0 MG/ML: 60 INJECTION SUBCUTANEOUS at 00:00
